# Patient Record
Sex: FEMALE | Race: WHITE | Employment: UNEMPLOYED | URBAN - METROPOLITAN AREA
[De-identification: names, ages, dates, MRNs, and addresses within clinical notes are randomized per-mention and may not be internally consistent; named-entity substitution may affect disease eponyms.]

---

## 2017-01-10 ENCOUNTER — PATIENT MESSAGE (OUTPATIENT)
Dept: FAMILY MEDICINE CLINIC | Age: 72
End: 2017-01-10

## 2017-01-10 DIAGNOSIS — Z01.00 EYE EXAM, ROUTINE: Primary | ICD-10-CM

## 2017-02-24 ENCOUNTER — OFFICE VISIT (OUTPATIENT)
Dept: FAMILY MEDICINE CLINIC | Age: 72
End: 2017-02-24

## 2017-02-24 VITALS
OXYGEN SATURATION: 98 % | DIASTOLIC BLOOD PRESSURE: 76 MMHG | SYSTOLIC BLOOD PRESSURE: 137 MMHG | HEIGHT: 66 IN | BODY MASS INDEX: 28.77 KG/M2 | TEMPERATURE: 98.4 F | WEIGHT: 179 LBS | RESPIRATION RATE: 12 BRPM | HEART RATE: 78 BPM

## 2017-02-24 DIAGNOSIS — Z00.00 WELL ADULT EXAM: ICD-10-CM

## 2017-02-24 DIAGNOSIS — Z91.040 LATEX ALLERGY: ICD-10-CM

## 2017-02-24 DIAGNOSIS — Z13.29 THYROID DISORDER SCREEN: ICD-10-CM

## 2017-02-24 DIAGNOSIS — N30.00 ACUTE CYSTITIS WITHOUT HEMATURIA: ICD-10-CM

## 2017-02-24 DIAGNOSIS — I83.892 VARICOSE VEINS OF LEG WITH EDEMA, LEFT: ICD-10-CM

## 2017-02-24 DIAGNOSIS — Z13.1 DIABETES MELLITUS SCREENING: ICD-10-CM

## 2017-02-24 DIAGNOSIS — Z00.00 WELL ADULT EXAM: Primary | ICD-10-CM

## 2017-02-24 DIAGNOSIS — Z11.59 NEED FOR HEPATITIS C SCREENING TEST: ICD-10-CM

## 2017-02-24 DIAGNOSIS — Z23 NEED FOR PNEUMOCOCCAL VACCINATION: ICD-10-CM

## 2017-02-24 DIAGNOSIS — R35.0 URINARY FREQUENCY: ICD-10-CM

## 2017-02-24 PROBLEM — I83.899 VARICOSE VEINS OF LEG WITH EDEMA: Status: ACTIVE | Noted: 2017-02-24

## 2017-02-24 LAB
A/G RATIO: 1.7 (ref 1.1–2.2)
ALBUMIN SERPL-MCNC: 4.6 G/DL (ref 3.4–5)
ALP BLD-CCNC: 81 U/L (ref 40–129)
ALT SERPL-CCNC: 15 U/L (ref 10–40)
ANION GAP SERPL CALCULATED.3IONS-SCNC: 16 MMOL/L (ref 3–16)
AST SERPL-CCNC: 19 U/L (ref 15–37)
BILIRUB SERPL-MCNC: 0.5 MG/DL (ref 0–1)
BILIRUBIN, POC: NORMAL
BLOOD URINE, POC: NORMAL
BUN BLDV-MCNC: 13 MG/DL (ref 7–20)
CALCIUM SERPL-MCNC: 9.8 MG/DL (ref 8.3–10.6)
CHLORIDE BLD-SCNC: 100 MMOL/L (ref 99–110)
CLARITY, POC: NORMAL
CO2: 28 MMOL/L (ref 21–32)
COLOR, POC: YELLOW
CREAT SERPL-MCNC: 0.9 MG/DL (ref 0.6–1.2)
GFR AFRICAN AMERICAN: >60
GFR NON-AFRICAN AMERICAN: >60
GLOBULIN: 2.7 G/DL
GLUCOSE BLD-MCNC: 104 MG/DL (ref 70–99)
GLUCOSE URINE, POC: NORMAL
KETONES, POC: NORMAL
LEUKOCYTE EST, POC: NORMAL
NITRITE, POC: NORMAL
PH, POC: 6.5
POTASSIUM SERPL-SCNC: 4.2 MMOL/L (ref 3.5–5.1)
PROTEIN, POC: NORMAL
SODIUM BLD-SCNC: 144 MMOL/L (ref 136–145)
SPECIFIC GRAVITY, POC: 1.01
TOTAL PROTEIN: 7.3 G/DL (ref 6.4–8.2)
TSH REFLEX: 2.28 UIU/ML (ref 0.27–4.2)
UROBILINOGEN, POC: 0.2

## 2017-02-24 PROCEDURE — G0439 PPPS, SUBSEQ VISIT: HCPCS | Performed by: FAMILY MEDICINE

## 2017-02-24 PROCEDURE — 81002 URINALYSIS NONAUTO W/O SCOPE: CPT | Performed by: FAMILY MEDICINE

## 2017-02-24 RX ORDER — TRIAMTERENE AND HYDROCHLOROTHIAZIDE 37.5; 25 MG/1; MG/1
1 TABLET ORAL DAILY PRN
Qty: 30 TABLET | Refills: 3 | Status: SHIPPED | OUTPATIENT
Start: 2017-02-24 | End: 2018-08-06

## 2017-02-24 RX ORDER — MAGNESIUM 200 MG
200 TABLET ORAL DAILY
COMMUNITY

## 2017-02-24 RX ORDER — SULFAMETHOXAZOLE AND TRIMETHOPRIM 800; 160 MG/1; MG/1
1 TABLET ORAL 2 TIMES DAILY
Qty: 10 TABLET | Refills: 0 | Status: SHIPPED | OUTPATIENT
Start: 2017-02-24 | End: 2017-03-01

## 2017-02-24 ASSESSMENT — ENCOUNTER SYMPTOMS
ANAL BLEEDING: 0
CONSTIPATION: 0
BLOOD IN STOOL: 0
DIARRHEA: 0
ABDOMINAL PAIN: 0
TROUBLE SWALLOWING: 0
CHOKING: 0
SORE THROAT: 0
NAUSEA: 0
WHEEZING: 0
CHEST TIGHTNESS: 0
SHORTNESS OF BREATH: 0
VOICE CHANGE: 0

## 2017-02-26 LAB
HEPATITIS C ANTIBODY INTERPRETATION: NORMAL
ORGANISM: ABNORMAL
URINE CULTURE, ROUTINE: ABNORMAL

## 2017-02-28 PROCEDURE — G0009 ADMIN PNEUMOCOCCAL VACCINE: HCPCS | Performed by: FAMILY MEDICINE

## 2017-02-28 PROCEDURE — 90732 PPSV23 VACC 2 YRS+ SUBQ/IM: CPT | Performed by: FAMILY MEDICINE

## 2017-03-01 LAB
ALLERGEN LATEX: <0.1 KU/L
ALLERGEN SEE NOTE: NORMAL

## 2017-03-04 ENCOUNTER — PATIENT MESSAGE (OUTPATIENT)
Dept: FAMILY MEDICINE CLINIC | Age: 72
End: 2017-03-04

## 2017-04-17 ENCOUNTER — OFFICE VISIT (OUTPATIENT)
Dept: FAMILY MEDICINE CLINIC | Age: 72
End: 2017-04-17

## 2017-04-17 VITALS
HEIGHT: 66 IN | OXYGEN SATURATION: 97 % | RESPIRATION RATE: 12 BRPM | TEMPERATURE: 97.8 F | BODY MASS INDEX: 28.64 KG/M2 | SYSTOLIC BLOOD PRESSURE: 116 MMHG | DIASTOLIC BLOOD PRESSURE: 65 MMHG | WEIGHT: 178.2 LBS | HEART RATE: 55 BPM

## 2017-04-17 DIAGNOSIS — Z01.818 PREOP EXAMINATION: Primary | ICD-10-CM

## 2017-04-17 DIAGNOSIS — H26.9 CATARACT: ICD-10-CM

## 2017-04-17 DIAGNOSIS — R20.9 SKIN SENSATION DISTURBANCE: ICD-10-CM

## 2017-04-17 DIAGNOSIS — B07.9 VIRAL WARTS, UNSPECIFIED TYPE: ICD-10-CM

## 2017-04-17 PROCEDURE — 17110 DESTRUCTION B9 LES UP TO 14: CPT | Performed by: FAMILY MEDICINE

## 2017-04-17 PROCEDURE — 99214 OFFICE O/P EST MOD 30 MIN: CPT | Performed by: FAMILY MEDICINE

## 2017-04-17 ASSESSMENT — PATIENT HEALTH QUESTIONNAIRE - PHQ9
SUM OF ALL RESPONSES TO PHQ QUESTIONS 1-9: 0
SUM OF ALL RESPONSES TO PHQ9 QUESTIONS 1 & 2: 0
1. LITTLE INTEREST OR PLEASURE IN DOING THINGS: 0
2. FEELING DOWN, DEPRESSED OR HOPELESS: 0

## 2017-05-05 ENCOUNTER — PATIENT MESSAGE (OUTPATIENT)
Dept: FAMILY MEDICINE CLINIC | Age: 72
End: 2017-05-05

## 2017-05-05 DIAGNOSIS — L57.8 SUN-DAMAGED SKIN: Primary | ICD-10-CM

## 2017-05-22 ENCOUNTER — OFFICE VISIT (OUTPATIENT)
Dept: DERMATOLOGY | Age: 72
End: 2017-05-22

## 2017-05-22 DIAGNOSIS — D22.9 BENIGN NEVUS: ICD-10-CM

## 2017-05-22 DIAGNOSIS — L57.0 AK (ACTINIC KERATOSIS): ICD-10-CM

## 2017-05-22 DIAGNOSIS — L82.1 SEBORRHEIC KERATOSES: ICD-10-CM

## 2017-05-22 DIAGNOSIS — D48.5 NEOPLASM OF UNCERTAIN BEHAVIOR OF SKIN: Primary | ICD-10-CM

## 2017-05-22 DIAGNOSIS — L82.0 SEBORRHEIC KERATOSES, INFLAMED: ICD-10-CM

## 2017-05-22 PROCEDURE — 17003 DESTRUCT PREMALG LES 2-14: CPT | Performed by: DERMATOLOGY

## 2017-05-22 PROCEDURE — 17110 DESTRUCTION B9 LES UP TO 14: CPT | Performed by: DERMATOLOGY

## 2017-05-22 PROCEDURE — 17000 DESTRUCT PREMALG LESION: CPT | Performed by: DERMATOLOGY

## 2017-05-22 PROCEDURE — 99213 OFFICE O/P EST LOW 20 MIN: CPT | Performed by: DERMATOLOGY

## 2017-05-25 ENCOUNTER — TELEPHONE (OUTPATIENT)
Dept: DERMATOLOGY | Age: 72
End: 2017-05-25

## 2017-09-18 ENCOUNTER — PATIENT MESSAGE (OUTPATIENT)
Dept: FAMILY MEDICINE CLINIC | Age: 72
End: 2017-09-18

## 2017-09-18 DIAGNOSIS — M16.10 PRIMARY OSTEOARTHRITIS OF HIP, UNSPECIFIED LATERALITY: Primary | ICD-10-CM

## 2017-11-21 ENCOUNTER — OFFICE VISIT (OUTPATIENT)
Dept: FAMILY MEDICINE CLINIC | Age: 72
End: 2017-11-21

## 2017-11-21 ENCOUNTER — TELEPHONE (OUTPATIENT)
Dept: FAMILY MEDICINE CLINIC | Age: 72
End: 2017-11-21

## 2017-11-21 VITALS
DIASTOLIC BLOOD PRESSURE: 78 MMHG | RESPIRATION RATE: 16 BRPM | TEMPERATURE: 99.8 F | BODY MASS INDEX: 30.28 KG/M2 | WEIGHT: 188.4 LBS | HEIGHT: 66 IN | OXYGEN SATURATION: 96 % | HEART RATE: 80 BPM | SYSTOLIC BLOOD PRESSURE: 112 MMHG

## 2017-11-21 DIAGNOSIS — Z01.818 PREOP EXAMINATION: Primary | ICD-10-CM

## 2017-11-21 DIAGNOSIS — M17.12 PRIMARY OSTEOARTHRITIS OF LEFT KNEE: ICD-10-CM

## 2017-11-21 PROCEDURE — 93000 ELECTROCARDIOGRAM COMPLETE: CPT | Performed by: FAMILY MEDICINE

## 2017-11-21 PROCEDURE — 99215 OFFICE O/P EST HI 40 MIN: CPT | Performed by: FAMILY MEDICINE

## 2017-11-21 NOTE — PROGRESS NOTES
Preoperative Consultation      Madelyn Khan  YOB: 1945    Date of Service:  11/21/2017    There were no vitals filed for this visit. Wt Readings from Last 2 Encounters:   04/17/17 178 lb 3.2 oz (80.8 kg)   02/24/17 179 lb (81.2 kg)     BP Readings from Last 3 Encounters:   04/17/17 116/65   02/24/17 137/76   01/11/16 (!) 134/92        Chief Complaint   Patient presents with   [facility] Pre-op Exam     left knee surgery on 12/13 at [facility] w/ Dr. Ro Fletcher. Allergies   Allergen Reactions    Shellfish-Derived Products     Pcn [Penicillins] Rash     No outpatient prescriptions have been marked as taking for the 11/21/17 encounter (Appointment) with Marv Mayes MD.       This patient presents to the office today for a preoperative consultation at the request of surgeon, Dr. Ro Fletcher, who plans on performing left total knee replacement on December 13 at [facility].  .    Planned anesthesia: General   Known anesthesia problems: None   Bleeding risk: No recent or remote history of abnormal bleeding  Personal or FH of DVT/PE: No    Patient objection to receiving blood products: No    Patient Active Problem List   Diagnosis    Osteoarthritis    Lumbar disc disease    Osteoarthritis of hip    Edema    Varicose veins of leg with edema       Past Medical History:   Diagnosis Date    Adenomatous colon polyp 6/2015    Osteoarthritis 4/28/2011    Osteoarthritis, knee     Polyp of colon 4/28/2011    Tinnitus 4/28/2011     Past Surgical History:   Procedure Laterality Date    APPENDECTOMY      BREAST SURGERY      lumpectomy    CATARACT REMOVAL WITH IMPLANT       Family History   Problem Relation Age of Onset    Stroke Mother 80     recovered    Other Mother      DVT    Tuberculosis Father 47    Diabetes Maternal Grandmother      lived to be 80    Osteoarthritis Brother     Osteoarthritis Sister      Social History     Social History    Marital status:      Spouse name: N/A    Number of children: N/A    Years of education: N/A     Occupational History    Not on file. Social History Main Topics    Smoking status: Never Smoker    Smokeless tobacco: Never Used    Alcohol use No    Drug use: No    Sexual activity: Not on file     Other Topics Concern    Not on file     Social History Narrative    No narrative on file       Review of Systems  A comprehensive review of systems was negative except for: mild stable edema. Physical Exam   Constitutional: She is oriented to person, place, and time. She appears well-developed and well-nourished. No distress. HENT:   Head: Normocephalic and atraumatic. Mouth/Throat: Uvula is midline, oropharynx is clear and moist and mucous membranes are normal.   Eyes: Conjunctivae and EOM are normal. Pupils are equal, round, and reactive to light. Neck: Trachea normal and normal range of motion. Neck supple. No JVD present. Carotid bruit is not present. No mass and no thyromegaly present. Cardiovascular: Normal rate, regular rhythm, normal heart sounds and intact distal pulses. Exam reveals no gallop and no friction rub. No murmur heard. Pulmonary/Chest: Effort normal and breath sounds normal. No respiratory distress. She has no wheezes. She has no rales. Abdominal: Soft. Normal aorta and bowel sounds are normal. She exhibits no distension and no mass. There is no hepatosplenomegaly. No tenderness. Musculoskeletal: She exhibits no edema and no tenderness. Neurological: She is alert and oriented to person, place, and time. She has normal strength. No cranial nerve deficit. Coordination and gait normal.   Skin: Skin is warm and dry. No rash noted. No erythema. Psychiatric: She has a normal mood and affect. Her behavior is normal.     EKG Interpretation:  normal EKG, normal sinus rhythm, unchanged from previous tracings.     Lab Review   Lab Results   Component Value Date     02/24/2017    K 4.2 02/24/2017     02/24/2017    CO2 28 02/24/2017    BUN 13 02/24/2017    CREATININE 0.9 02/24/2017    GLUCOSE 104 02/24/2017    CALCIUM 9.8 02/24/2017     Lab Results   Component Value Date    WBC 6.5 11/30/2015    HGB 12.8 11/30/2015    HCT 39.7 11/30/2015    MCV 92.7 11/30/2015     11/30/2015      Has labs ordered at her pre-op at the hospital next week. Assessment:       67 y.o. patient with planned surgery as above. Known risk factors for perioperative complications: None  Current medications which may produce withdrawal symptoms if withheld perioperatively: none      Plan:     1. Preoperative workup as follows: ECG  2. Change in medication regimen before surgery: stop fish oil one week before surgery  3. Prophylaxis for cardiac events with perioperative beta-blockers: Not indicated  ACC/AHA indications for pre-operative beta-blocker use:    · Vascular surgery with history of postitive stress test  · Intermediate or high risk surgery with history of CAD   · Intermediate or high risk surgery with multiple clinical predictors of CAD- 2 of the following: history of compensated or prior heart failure, history of cerebrovascular disease, DM, or renal insufficiency    Routine administration of higher-dose, long-acting metoprolol in beta-blockernaïve patients on the day of surgery, and in the absence of dose titration is associated with an overall increase in mortality. Beta-blockers should be started days to weeks prior to surgery and titrated to pulse < 70.  4. Deep vein thrombosis prophylaxis: regimen to be chosen by surgical team  5. No contraindications to planned surgery pending labs.

## 2018-08-06 ENCOUNTER — OFFICE VISIT (OUTPATIENT)
Dept: FAMILY MEDICINE CLINIC | Age: 73
End: 2018-08-06

## 2018-08-06 VITALS
TEMPERATURE: 97.7 F | RESPIRATION RATE: 12 BRPM | HEART RATE: 64 BPM | OXYGEN SATURATION: 99 % | WEIGHT: 194 LBS | BODY MASS INDEX: 31.55 KG/M2 | DIASTOLIC BLOOD PRESSURE: 76 MMHG | SYSTOLIC BLOOD PRESSURE: 128 MMHG

## 2018-08-06 DIAGNOSIS — Z23 NEED FOR SHINGLES VACCINE: ICD-10-CM

## 2018-08-06 DIAGNOSIS — Z00.00 WELL ADULT EXAM: Primary | ICD-10-CM

## 2018-08-06 DIAGNOSIS — Z23 NEED FOR TDAP VACCINATION: ICD-10-CM

## 2018-08-06 DIAGNOSIS — R35.0 URINARY FREQUENCY: ICD-10-CM

## 2018-08-06 DIAGNOSIS — Z13.29 THYROID DISORDER SCREEN: ICD-10-CM

## 2018-08-06 DIAGNOSIS — I83.892 VARICOSE VEINS OF LEG WITH EDEMA, LEFT: ICD-10-CM

## 2018-08-06 DIAGNOSIS — I83.90 VARICOSE VEIN OF LEG: ICD-10-CM

## 2018-08-06 DIAGNOSIS — N30.00 ACUTE CYSTITIS WITHOUT HEMATURIA: ICD-10-CM

## 2018-08-06 LAB
BILIRUBIN, POC: ABNORMAL
BLOOD URINE, POC: NEGATIVE
CLARITY, POC: ABNORMAL
COLOR, POC: YELLOW
GLUCOSE URINE, POC: NEGATIVE
KETONES, POC: NEGATIVE
LEUKOCYTE EST, POC: ABNORMAL
NITRITE, POC: POSITIVE
PH, POC: 6
PROTEIN, POC: NEGATIVE
SPECIFIC GRAVITY, POC: 1015
UROBILINOGEN, POC: 0.2

## 2018-08-06 PROCEDURE — 81002 URINALYSIS NONAUTO W/O SCOPE: CPT | Performed by: FAMILY MEDICINE

## 2018-08-06 PROCEDURE — G0439 PPPS, SUBSEQ VISIT: HCPCS | Performed by: FAMILY MEDICINE

## 2018-08-06 RX ORDER — TRIAMTERENE AND HYDROCHLOROTHIAZIDE 37.5; 25 MG/1; MG/1
1 TABLET ORAL DAILY PRN
Qty: 30 TABLET | Refills: 5 | Status: SHIPPED | OUTPATIENT
Start: 2018-08-06 | End: 2018-11-05

## 2018-08-06 RX ORDER — NITROFURANTOIN 25; 75 MG/1; MG/1
100 CAPSULE ORAL 2 TIMES DAILY
Qty: 14 CAPSULE | Refills: 0 | Status: SHIPPED | OUTPATIENT
Start: 2018-08-06 | End: 2018-08-13

## 2018-08-06 ASSESSMENT — ENCOUNTER SYMPTOMS
DIARRHEA: 0
BLOOD IN STOOL: 0
SHORTNESS OF BREATH: 0
SORE THROAT: 0
CONSTIPATION: 0
VOICE CHANGE: 0
NAUSEA: 0
TROUBLE SWALLOWING: 0
CHOKING: 0
ANAL BLEEDING: 0
WHEEZING: 0
CHEST TIGHTNESS: 0
ABDOMINAL PAIN: 0

## 2018-08-06 ASSESSMENT — PATIENT HEALTH QUESTIONNAIRE - PHQ9
1. LITTLE INTEREST OR PLEASURE IN DOING THINGS: 0
SUM OF ALL RESPONSES TO PHQ9 QUESTIONS 1 & 2: 0
SUM OF ALL RESPONSES TO PHQ QUESTIONS 1-9: 0
2. FEELING DOWN, DEPRESSED OR HOPELESS: 0

## 2018-08-08 LAB
ORGANISM: ABNORMAL
URINE CULTURE, ROUTINE: ABNORMAL
URINE CULTURE, ROUTINE: ABNORMAL

## 2018-08-08 RX ORDER — SULFAMETHOXAZOLE AND TRIMETHOPRIM 800; 160 MG/1; MG/1
1 TABLET ORAL 2 TIMES DAILY
Qty: 6 TABLET | Refills: 0 | Status: SHIPPED | OUTPATIENT
Start: 2018-08-08 | End: 2018-08-11

## 2018-08-14 ENCOUNTER — TELEPHONE (OUTPATIENT)
Dept: FAMILY MEDICINE CLINIC | Age: 73
End: 2018-08-14

## 2018-08-14 NOTE — TELEPHONE ENCOUNTER
Patient scheduled tomorrow and was advised that referral may not be ready in time due to short notice. Initiate as soon as possible              Specialist name: Sienna Shah   Date of 8-15-18 appointment        Reason for referral: dermatology   Diagnosis:      Insurance Name: Praveen Nicolas ID#: [id number] [facility] #[id number]       Procedure Code:     Specialist NPI#    Specialist Tax ID#          Referrals require 7-10 business days notice for completion. It is the patient's responsibility to ensure the provider is in-network with their insurance company. If an insurance referral is required, authorization must be complete before the appointment or patient may be responsible for the bill.

## 2018-09-05 PROBLEM — Z00.00 WELL ADULT EXAM: Status: RESOLVED | Noted: 2018-08-06 | Resolved: 2018-09-05

## 2018-11-05 ENCOUNTER — OFFICE VISIT (OUTPATIENT)
Dept: DERMATOLOGY | Age: 73
End: 2018-11-05
Payer: MEDICARE

## 2018-11-05 DIAGNOSIS — D22.9 BENIGN NEVUS: ICD-10-CM

## 2018-11-05 DIAGNOSIS — L82.1 SEBORRHEIC KERATOSES: ICD-10-CM

## 2018-11-05 DIAGNOSIS — L82.0 SEBORRHEIC KERATOSES, INFLAMED: Primary | ICD-10-CM

## 2018-11-05 PROCEDURE — 11310 SHAVE SKIN LESION 0.5 CM/<: CPT | Performed by: DERMATOLOGY

## 2018-11-05 PROCEDURE — 99213 OFFICE O/P EST LOW 20 MIN: CPT | Performed by: DERMATOLOGY

## 2018-11-05 PROCEDURE — 17110 DESTRUCTION B9 LES UP TO 14: CPT | Performed by: DERMATOLOGY

## 2018-11-05 NOTE — PROGRESS NOTES
and extremities are multiple well-defined round and oval symmetric smoothly-bordered uniformly brown macules and papules. Multiple hyperkeratotic stuck on papules and plaques over her torso. 2 cm hyperkeratotic stuck on visibly traumatize plaque right temple within hairline  Left nasal side wall 5 mm keratotic papule-irritated seborrheic keratosis          Assessment and Plan     1. Seborrheic keratoses, inflamed - Right temple-1 lesion(s) treated w/ liquid nitrogen. Edu re: risk of blister formation, discomfort, scar, hypopigmentation. Discussed wound care. Left nasal sidewall--Verbal consent obtained after risks (infection, bleeding, scar), benefits and alternatives explained. -Area(s) to be shaved were marked with a surgical pen. Site(s) were cleansed with alcohol. Local anesthesia achieved with 1% lidocaine with epinephrine/sodium bicarbonate. Shave lesion performed with a razor blade. Hemostasis was achieved with aluminum chloride. The wound(s) were dressed with petrolatum and covered with a bandage. Specimen(s) sent to pathology. Pt educated re: risk of bleeding, infection, scar and wound care instructions. 2. Seborrheic keratoses -Monitor for change    3.  Benign nevus - Benign acquired melanocytic nevi  -Recommend monthly self skin exams   -Educated regarding the ABCDEs of melanoma detection   -Call for any new/changing moles or concerning lesions  -Reviewed sun protective behavior -- sun avoidance during the peak hours of the day, sun-protective clothing (including hat and sunglasses), sunscreen use (water resistant, broad spectrum, SPF at least 30, need for reapplication every 2 to 3 hours), avoidance of tanning beds

## 2018-11-07 ENCOUNTER — PROCEDURE VISIT (OUTPATIENT)
Dept: VASCULAR SURGERY | Age: 73
End: 2018-11-07
Payer: MEDICARE

## 2018-11-07 ENCOUNTER — OFFICE VISIT (OUTPATIENT)
Dept: VASCULAR SURGERY | Age: 73
End: 2018-11-07
Payer: MEDICARE

## 2018-11-07 VITALS
SYSTOLIC BLOOD PRESSURE: 132 MMHG | HEIGHT: 68 IN | BODY MASS INDEX: 28.04 KG/M2 | DIASTOLIC BLOOD PRESSURE: 78 MMHG | WEIGHT: 185 LBS | HEART RATE: 72 BPM

## 2018-11-07 DIAGNOSIS — I83.12 VARICOSE VEINS OF BOTH LOWER EXTREMITIES WITH INFLAMMATION: Primary | ICD-10-CM

## 2018-11-07 DIAGNOSIS — I82.819: ICD-10-CM

## 2018-11-07 DIAGNOSIS — I80.02 PHLEBITIS OF SUPERFICIAL VEIN OF LEFT LOWER EXTREMITY: ICD-10-CM

## 2018-11-07 DIAGNOSIS — I83.11 VARICOSE VEINS OF BOTH LOWER EXTREMITIES WITH INFLAMMATION: Primary | ICD-10-CM

## 2018-11-07 DIAGNOSIS — I83.892 SYMPTOMATIC VARICOSE VEINS OF LEFT LOWER EXTREMITY: Primary | ICD-10-CM

## 2018-11-07 LAB — DERMATOLOGY PATHOLOGY REPORT: NORMAL

## 2018-11-07 PROCEDURE — 99204 OFFICE O/P NEW MOD 45 MIN: CPT | Performed by: SURGERY

## 2018-11-07 PROCEDURE — 93970 EXTREMITY STUDY: CPT | Performed by: SURGERY

## 2018-11-07 ASSESSMENT — ENCOUNTER SYMPTOMS
EYES NEGATIVE: 1
GASTROINTESTINAL NEGATIVE: 1
ALLERGIC/IMMUNOLOGIC NEGATIVE: 1
RESPIRATORY NEGATIVE: 1

## 2019-01-28 ENCOUNTER — OFFICE VISIT (OUTPATIENT)
Dept: FAMILY MEDICINE CLINIC | Age: 74
End: 2019-01-28
Payer: MEDICARE

## 2019-01-28 VITALS
HEART RATE: 79 BPM | OXYGEN SATURATION: 99 % | RESPIRATION RATE: 12 BRPM | DIASTOLIC BLOOD PRESSURE: 86 MMHG | BODY MASS INDEX: 30.37 KG/M2 | TEMPERATURE: 97.8 F | WEIGHT: 196.8 LBS | SYSTOLIC BLOOD PRESSURE: 142 MMHG

## 2019-01-28 DIAGNOSIS — G56.21 ULNAR NEUROPATHY AT ELBOW, RIGHT: ICD-10-CM

## 2019-01-28 DIAGNOSIS — R10.2 PELVIC PAIN: Primary | ICD-10-CM

## 2019-01-28 DIAGNOSIS — R42 VERTIGO: ICD-10-CM

## 2019-01-28 LAB
BILIRUBIN, POC: NEGATIVE
BLOOD URINE, POC: NEGATIVE
CLARITY, POC: CLEAR
COLOR, POC: YELLOW
GLUCOSE URINE, POC: NEGATIVE
KETONES, POC: NEGATIVE
LEUKOCYTE EST, POC: NORMAL
NITRITE, POC: NEGATIVE
PH, POC: 7.5
PROTEIN, POC: NORMAL
SPECIFIC GRAVITY, POC: 1
UROBILINOGEN, POC: 0.2

## 2019-01-28 PROCEDURE — 81002 URINALYSIS NONAUTO W/O SCOPE: CPT | Performed by: FAMILY MEDICINE

## 2019-01-28 PROCEDURE — 99214 OFFICE O/P EST MOD 30 MIN: CPT | Performed by: FAMILY MEDICINE

## 2019-01-30 DIAGNOSIS — R10.2 PELVIC PAIN: Primary | ICD-10-CM

## 2019-01-30 LAB — URINE CULTURE, ROUTINE: NORMAL

## 2019-08-27 ENCOUNTER — OFFICE VISIT (OUTPATIENT)
Dept: FAMILY MEDICINE CLINIC | Age: 74
End: 2019-08-27
Payer: MEDICARE

## 2019-08-27 VITALS
DIASTOLIC BLOOD PRESSURE: 86 MMHG | RESPIRATION RATE: 12 BRPM | TEMPERATURE: 98.3 F | BODY MASS INDEX: 27.16 KG/M2 | HEIGHT: 66 IN | WEIGHT: 169 LBS | SYSTOLIC BLOOD PRESSURE: 136 MMHG | OXYGEN SATURATION: 100 % | HEART RATE: 80 BPM

## 2019-08-27 DIAGNOSIS — Z12.31 ENCOUNTER FOR SCREENING MAMMOGRAM FOR BREAST CANCER: Primary | ICD-10-CM

## 2019-08-27 DIAGNOSIS — Z00.00 WELL ADULT EXAM: ICD-10-CM

## 2019-08-27 DIAGNOSIS — Z13.29 THYROID DISORDER SCREEN: ICD-10-CM

## 2019-08-27 DIAGNOSIS — Z00.00 ROUTINE GENERAL MEDICAL EXAMINATION AT A HEALTH CARE FACILITY: ICD-10-CM

## 2019-08-27 DIAGNOSIS — Z13.220 LIPID SCREENING: ICD-10-CM

## 2019-08-27 PROCEDURE — G0439 PPPS, SUBSEQ VISIT: HCPCS | Performed by: FAMILY MEDICINE

## 2019-08-27 ASSESSMENT — LIFESTYLE VARIABLES
HOW OFTEN DURING THE LAST YEAR HAVE YOU HAD A FEELING OF GUILT OR REMORSE AFTER DRINKING: 0
AUDIT TOTAL SCORE: 1
HOW OFTEN DO YOU HAVE A DRINK CONTAINING ALCOHOL: 1
HOW OFTEN DURING THE LAST YEAR HAVE YOU FAILED TO DO WHAT WAS NORMALLY EXPECTED FROM YOU BECAUSE OF DRINKING: 0
AUDIT-C TOTAL SCORE: 1
HAVE YOU OR SOMEONE ELSE BEEN INJURED AS A RESULT OF YOUR DRINKING: 0
HOW OFTEN DURING THE LAST YEAR HAVE YOU FOUND THAT YOU WERE NOT ABLE TO STOP DRINKING ONCE YOU HAD STARTED: 0
HOW MANY STANDARD DRINKS CONTAINING ALCOHOL DO YOU HAVE ON A TYPICAL DAY: 0
HAS A RELATIVE, FRIEND, DOCTOR, OR ANOTHER HEALTH PROFESSIONAL EXPRESSED CONCERN ABOUT YOUR DRINKING OR SUGGESTED YOU CUT DOWN: 0
HOW OFTEN DURING THE LAST YEAR HAVE YOU NEEDED AN ALCOHOLIC DRINK FIRST THING IN THE MORNING TO GET YOURSELF GOING AFTER A NIGHT OF HEAVY DRINKING: 0
HOW OFTEN DO YOU HAVE SIX OR MORE DRINKS ON ONE OCCASION: 0
HOW OFTEN DURING THE LAST YEAR HAVE YOU BEEN UNABLE TO REMEMBER WHAT HAPPENED THE NIGHT BEFORE BECAUSE YOU HAD BEEN DRINKING: 0

## 2019-08-27 ASSESSMENT — PATIENT HEALTH QUESTIONNAIRE - PHQ9
SUM OF ALL RESPONSES TO PHQ QUESTIONS 1-9: 0
SUM OF ALL RESPONSES TO PHQ QUESTIONS 1-9: 0

## 2019-08-27 NOTE — PROGRESS NOTES
recovered    Other Mother         DVT    Tuberculosis Father 47    Diabetes Maternal Grandmother         lived to be 80    Osteoarthritis Brother     Colon Cancer Brother 67    Osteoarthritis Sister    Lauraine Perks Sister 79    Cancer Sister 48        [facility]       [place]Team (Including outside providers/suppliers regularly involved in providing care):   Patient Care Team:  Rhiannon Knapp MD as PCP - General (Family Medicine)  Rhiannon Knapp [facility] PCP [facility]    Wt Readings from Last 3 Encounters:   08/27/19 169 lb (76.7 kg)   01/28/19 196 lb 12.8 oz (89.3 kg)   11/07/18 185 lb (83.9 kg)     Vitals:    08/27/19 0838   BP: 136/86   Pulse: 80   Resp: 12   Temp: 98.3 °F (36.8 °C)   TempSrc: Oral   SpO2: 100%   Weight: 169 lb (76.7 kg)   Height: 5' 5.5\" (1.664 m)     Body mass index is 27.7 kg/m². Based upon direct observation of the patient, evaluation of cognition reveals recent and remote memory intact.     General Appearance: alert and oriented to person, place and time, well developed and well- nourished, in no acute distress  Skin: warm and dry, no rash or erythema  Head: normocephalic and atraumatic  Eyes: pupils equal, round, and reactive to light, extraocular eye movements intact, conjunctivae normal  ENT: tympanic membrane, external ear and ear canal normal bilaterally, nose without deformity, nasal mucosa and turbinates normal without polyps  Neck: supple and non-tender without mass, no thyromegaly or thyroid nodules, no cervical lymphadenopathy  Pulmonary/Chest: clear to auscultation bilaterally- no wheezes, rales or rhonchi, normal air movement, no respiratory distress  Cardiovascular: normal rate, regular rhythm, normal S1 and S2, no murmurs, rubs, clicks, or gallops, distal pulses intact, no carotid bruits  Abdomen: soft, non-tender, non-distended, normal bowel sounds, no masses or organomegaly  Extremities: no cyanosis, clubbing or edema  Musculoskeletal: normal range of motion, no joint swelling, deformity or tenderness  Neurologic: reflexes normal and symmetric, no cranial nerve deficit, gait, coordination and speech normal    Patient's complete Health Risk Assessment and screening values have been reviewed and are found in Flowsheets. The following problems were reviewed today and where indicated follow up appointments were made and/or referrals ordered. Positive Risk Factor Screenings with Interventions:     No Positive Risk Factors identified today. Personalized Preventive Plan   Current Health Maintenance Status  Immunization History   Administered Date(s) Administered    Pneumococcal Conjugate 13-valent ([id number]) 11/30/2015    Pneumococcal Polysaccharide ([id number]) 02/28/2017    Td, unspecified formulation 01/01/2005    Zoster Live (Zostavax) 06/29/2010, 05/22/2013        Health Maintenance   Topic Date Due    DTaP/Tdap/Td vaccine (1 - Tdap) 01/02/2005    Shingles Vaccine (2 of 3) 07/17/2013    Lipid screen  10/14/2018    Breast cancer screen  01/23/2019    Annual Wellness Visit (AWV)  08/06/2019    Flu vaccine (1) 09/01/2019    Colon cancer screen colonoscopy  05/07/2020    DEXA (modify frequency per FRAX score)  04/07/2021    Pneumococcal 65+ years Vaccine  Completed    Hepatitis C screen  Completed     Recommendations for Preventive Services Due: see orders and patient instructions/AVS.  . Recommended screening schedule for the next 5-10 years is provided to the patient in written form: see Patient Instructions/AVS.    There are no diagnoses linked to this encounter. Advance Care Planning   Advanced Care Planning: Discussed the patients choices for care and treatment in case of a health event that adversely affects decision-making abilities. Also discussed the patients long-term treatment options.  Reviewed with the patient the [address] [place] [address] Will Declaration forms  Reviewed the process of designating a

## 2019-08-28 LAB
ALBUMIN SERPL-MCNC: 3.9 G/DL
ALP BLD-CCNC: 60 U/L
ALT SERPL-CCNC: 14 U/L
ANION GAP SERPL CALCULATED.3IONS-SCNC: 9 MMOL/L
AST SERPL-CCNC: 20 U/L
BILIRUB SERPL-MCNC: 0.7 MG/DL (ref 0.1–1.4)
BUN BLDV-MCNC: 22 MG/DL
CALCIUM SERPL-MCNC: 9.7 MG/DL
CHLORIDE BLD-SCNC: 104 MMOL/L
CHOLESTEROL, TOTAL: 235 MG/DL
CHOLESTEROL/HDL RATIO: NORMAL
CO2: 29 MMOL/L
CREAT SERPL-MCNC: 0.94 MG/DL
GFR CALCULATED: NORMAL
GLUCOSE BLD-MCNC: 93 MG/DL
HDLC SERPL-MCNC: 64 MG/DL (ref 35–70)
LDL CHOLESTEROL CALCULATED: 141 MG/DL (ref 0–160)
POTASSIUM SERPL-SCNC: 4.2 MMOL/L
SODIUM BLD-SCNC: 142 MMOL/L
TOTAL PROTEIN: 7.2
TRIGL SERPL-MCNC: 150 MG/DL
TSH SERPL DL<=0.05 MIU/L-ACNC: 2.01 UIU/ML
VLDLC SERPL CALC-MCNC: NORMAL MG/DL

## 2019-08-29 DIAGNOSIS — Z13.220 LIPID SCREENING: ICD-10-CM

## 2019-08-29 DIAGNOSIS — Z00.00 WELL ADULT EXAM: ICD-10-CM

## 2019-08-29 DIAGNOSIS — Z13.29 THYROID DISORDER SCREEN: ICD-10-CM

## 2020-01-24 ENCOUNTER — TELEPHONE (OUTPATIENT)
Dept: ADMINISTRATIVE | Age: 75
End: 2020-01-24

## 2020-01-24 NOTE — TELEPHONE ENCOUNTER
PT. WANTS PCP TO SEND A REFERRAL FOR A dermatology appt to [facility] derm. marcela and thank you. Melvina Kinney

## 2020-01-30 NOTE — PROGRESS NOTES
Osteoarthritis Sister     Lung Cancer Sister 79    Cancer Sister 48        NMSC       Social History     Tobacco Use    Smoking status: Never Smoker    Smokeless tobacco: Never Used   Substance Use Topics    Alcohol use: No    Drug use: No            Review of Systems  Review of Systems    Objective:   Physical Exam  Vitals:    01/31/20 0926   BP: 126/78   Pulse: 78   Resp: 10   Temp: 98.1 °F (36.7 °C)   TempSrc: Oral   SpO2: 97%   Weight: 171 lb (77.6 kg)   Height: 5' 7\" (1.702 m)       Physical Exam  Constitutional:       General: She is not in acute distress. Appearance: She is well-developed. Cardiovascular:      Rate and Rhythm: Normal rate and regular rhythm. Heart sounds: Normal heart sounds. Pulmonary:      Effort: Pulmonary effort is normal.      Breath sounds: Normal breath sounds. Skin:     General: Skin is warm and dry. Comments: Top of scalp on the right is a 5 mm scaly, raised, oily light brown lesion. Anterior portion scabbed. No ulceration, induration. > 10 2-6 mm raised oily light to dark brown homogenous nevi           Assessment:       Diagnosis Orders   1. Seborrheic keratoses, inflamed  Triple antibx ointment BID x 7 days. 2. Skin lesions            Plan:      Reassured. No s/s malignancy. Scalp lesion may need bx if does not heal but appears benign. Keep appt with Dr. Demarco Zhou 2/10/20. Call or return to clinic prn if these symptoms worsen or fail to improve as anticipated.

## 2020-01-31 ENCOUNTER — OFFICE VISIT (OUTPATIENT)
Dept: FAMILY MEDICINE CLINIC | Age: 75
End: 2020-01-31
Payer: MEDICARE

## 2020-01-31 VITALS
OXYGEN SATURATION: 97 % | TEMPERATURE: 98.1 F | RESPIRATION RATE: 10 BRPM | SYSTOLIC BLOOD PRESSURE: 126 MMHG | WEIGHT: 171 LBS | HEIGHT: 67 IN | DIASTOLIC BLOOD PRESSURE: 78 MMHG | BODY MASS INDEX: 26.84 KG/M2 | HEART RATE: 78 BPM

## 2020-01-31 PROCEDURE — 99213 OFFICE O/P EST LOW 20 MIN: CPT | Performed by: FAMILY MEDICINE

## 2020-01-31 ASSESSMENT — PATIENT HEALTH QUESTIONNAIRE - PHQ9
1. LITTLE INTEREST OR PLEASURE IN DOING THINGS: 0
SUM OF ALL RESPONSES TO PHQ QUESTIONS 1-9: 0
2. FEELING DOWN, DEPRESSED OR HOPELESS: 0
SUM OF ALL RESPONSES TO PHQ9 QUESTIONS 1 & 2: 0
SUM OF ALL RESPONSES TO PHQ QUESTIONS 1-9: 0

## 2020-02-10 ENCOUNTER — OFFICE VISIT (OUTPATIENT)
Dept: DERMATOLOGY | Age: 75
End: 2020-02-10
Payer: MEDICARE

## 2020-02-10 PROCEDURE — 99213 OFFICE O/P EST LOW 20 MIN: CPT | Performed by: DERMATOLOGY

## 2020-02-10 PROCEDURE — 11102 TANGNTL BX SKIN SINGLE LES: CPT | Performed by: DERMATOLOGY

## 2020-02-10 NOTE — PROGRESS NOTES
[facility]) Dermatology  Feliciano Franks M.D.  [phone]       Lindsay Pickens      76 y.o. female     Date of Visit: 2/10/2020    Chief Complaint:   Chief Complaint   Patient presents with    Skin Lesion     spots on scalp- possible bleeding        I was asked to see this patient by Dr. Chapa ref. provider found. History of Present Illness:  1. Patient presents today for evaluation of a crusted papule on her right anterior scalp. She has a history of multiple seborrheic keratoses on her scalp-they are usually asymptomatic. A lesion on her right anterior scalp was noted by her hairdresser to have become crusted and bleed. Patient presents today for evaluation. Skin Hx:  No PH skin cancer/ DN   left anterior thigh hypertrophic actinic keratosis treated with curettage  + sister with over Julio Noemy 15[address] N[facility] (immunosuppressed)  Review of Systems:  Constitutional: Reports general sense of well-being       Past Medical History, Surgical History, Family History, Medications and Allergies reviewed.     Social History:   Social History     Socioeconomic History    Marital status:      Spouse name: Not on file    Number of children: Not on file    Years of education: Not on file    Highest education level: Not on file   Occupational History    Not on file   Social Needs    Financial resource strain: Not on file    Food insecurity:     Worry: Not on file     Inability: Not on file    Transportation needs:     Medical: Not on file     Non-medical: Not on file   Tobacco Use    Smoking status: Never Smoker    Smokeless tobacco: Never Used   Substance and Sexual Activity    Alcohol use: No    Drug use: No    Sexual activity: Yes     Partners: Male   Lifestyle    Physical activity:     Days per week: Not on file     Minutes per session: Not on file    Stress: Not on file   Relationships    Social connections:     Talks on phone: Not on file     Gets together: Not on file     Attends Qu[employer] service: Not on file     Active member of club or organization: Not on file     Attends meetings of clubs or organizations: Not on file     Relationship status: Not on file    Intimate partner violence:     Fear of current or ex partner: Not on file     Emotionally abused: Not on file     Physically abused: Not on file     Forced sexual activity: Not on file   Other Topics Concern    Not on file   Social History Narrative    Not on file       Physical Examination       -General: Well-appearing, NAD  Multiple hyperkeratotic stuck on papules and plaques throughout her scalp  Right anterior scalp 1.0 cm brown scaly plaque with superficial eschar-likely traumatized seborrheic keratosis rule out squamous cell carcinoma          Assessment and Plan     1. Seborrheic keratoses, inflamed-right anterior scalp--Discussed possible diagnosis. Patient agreeable to biopsy. Verbal consent obtained after risks (infection, bleeding, scar), benefits and alternatives explained. -Area(s) to be biopsied were marked with a surgical pen. Site(s) were cleansed with alcohol. Local anesthesia achieved with 1% lidocaine with epinephrine/sodium bicarbonate. Shave biopsy performed with a razor blade. Hemostasis was achieved with aluminum chloride. The wound(s) were dressed with petrolatum and covered with a bandage. Specimen(s) sent to pathology. Pt educated re: risk of bleeding, infection, scar and wound care instructions. 2. Seborrheic keratoses-monitor for change.   Treatment if lesions become symptomatic

## 2020-02-12 LAB — DERMATOLOGY PATHOLOGY REPORT: NORMAL

## 2020-02-24 ENCOUNTER — PATIENT MESSAGE (OUTPATIENT)
Dept: FAMILY MEDICINE CLINIC | Age: 75
End: 2020-02-24

## 2020-02-24 NOTE — TELEPHONE ENCOUNTER
From: Laura Rosas  To: Giovanni Meade MD  Sent: 2/24/2020  4:37 PM EST  Subject: Non-Urgent Medical Question    I have a scheduled eye appt. on March 6th which my insurance provider requires me to have a referral. If you would please provide this to Dr. Boyce at [facility] I would appreciate it.  Thank you, Alyssia Reyes [birth year]

## 2020-05-21 ENCOUNTER — NURSE TRIAGE (OUTPATIENT)
Dept: OTHER | Facility: CLINIC | Age: 75
End: 2020-05-21

## 2020-06-18 ENCOUNTER — TELEPHONE (OUTPATIENT)
Dept: FAMILY MEDICINE CLINIC | Age: 75
End: 2020-06-18

## 2020-06-29 PROBLEM — R91.8 PULMONARY NODULES: Status: ACTIVE | Noted: 2020-06-29

## 2020-06-29 PROBLEM — Z86.0101 HX OF ADENOMATOUS POLYP OF COLON: Status: ACTIVE | Noted: 2020-06-29

## 2020-06-29 PROBLEM — Z86.010 HX OF ADENOMATOUS POLYP OF COLON: Status: ACTIVE | Noted: 2020-06-29

## 2020-06-29 NOTE — PROGRESS NOTES
protocol.     Additional maximum intensity projection images were performed.       CONTRAST: 125 mL of IOHEXOL 350 MG IODINE/ML INTRAVENOUS SOLUTION administered intravenously        FINDINGS:    Study quality: Adequate    Artifacts: None       Medical devices: None.       Airways & lungs: The central airways are patent. Mild lingular and left basilar parenchymal    banding related atelectasis/scarring. There is minimal dependent atelectasis bilaterally. Otherwise, lungs are clear. There is a pulmonary nodule in the right upper lobe measuring 8 mm    in diameter (series 304 image 94). There are an additional limited number of pulmonary nodules    measuring up to 2 mm including a 2 mm nodule in the lingula (series 304, image 95).     Pleura: No pleural effusions or pneumothorax.       Lower Neck: Unremarkable.       Pulmonary Embolism: None.       Heart: The heart is normal in size. Minimal coronary artery calcifications.       Additional vascular structures: Unremarkable.       Mediastinum and yudith: Mildly prominent partially calcified bilateral hilar nodes are likely the    sequela of prior healed granulomatous disease. There are no enlarged mediastinal or hilar    lymph nodes.       Chest wall and axilla: Sebaceous cyst noted in the posterior back.       Upper abdomen: 1.3 cm low-density lesion in the right hepatic lobe, compatible with a benign    cyst or hemangioma. Otherwise, imaged upper abdomen is unremarkable.       Musculoskeletal: No suspicious osteolytic or osteoblastic lesions.     125   IMPRESSION:       1.  No acute pulmonary embolism. 2.  No findings to explain patient's left flank pain. 3.  Pulmonary nodules measuring up to 8 mm in average diameter.  Current guidelines recommend    follow-up low-dose chest CT in 6-12 months to ensure stability or resolution       Approved by Veto Aguilera DO on 6/8/2020 1:59 PM EDT         No outpatient medications have been marked as taking for the 7/1/20 encounter (Appointment) with Sabra Felix MD.        Allergies   Allergen Reactions    Shellfish-Derived Products     Pcn [Penicillins] Rash       Patient Active Problem List   Diagnosis    Osteoarthritis    Lumbar disc disease    Osteoarthritis of hip    Edema    Varicose veins of leg with edema    Pulmonary nodules       Past Medical History:   Diagnosis Date    Adenomatous colon polyp 6/2015    Osteoarthritis 4/28/2011    Osteoarthritis, knee     Polyp of colon 4/28/2011    Tinnitus 4/28/2011       Past Surgical History:   Procedure Laterality Date    APPENDECTOMY      BREAST SURGERY      lumpectomy    CATARACT REMOVAL WITH IMPLANT      JOINT REPLACEMENT Left 12/2017    JOINT REPLACEMENT Bilateral 2013, 2014        Family History   Problem Relation Age of Onset    Stroke Mother 80        recovered    Other Mother         DVT    Tuberculosis Father 47    Diabetes Maternal Grandmother         lived to be 80    Osteoarthritis Brother     Colon Cancer Brother 67    Osteoarthritis Sister     Lung Cancer Sister 79    Cancer Sister 48        [facility]       Social History     Tobacco Use    Smoking status: Never Smoker    Smokeless tobacco: Never Used   Substance Use Topics    Alcohol use: No    Drug use: No            Review of Systems  Review of Systems    Objective:   Physical Exam  Vitals:    07/01/20 0928   BP: 124/66   Pulse: 77   Resp: 12   Temp: 97.4 °F (36.3 °C)   TempSrc: Temporal   SpO2: 99%   Weight: 175 lb 9.6 oz (79.7 kg)       Physical Exam  NAD  Skin is warm and dry. Well hydrated, no rash. Ear exam - bilateral normal, TM intact without perforation or effusion, external canal normal. No significant ceruminosis noted. Nasal exam; septum midline, no deformities, nares patent, normal mucosa without swelling, no polyps, no bleeding. Pharynx normal, no oral lesions, dentition in good repair. Chest is clear, no wheezing or rales.  Normal symmetric air entry throughout both

## 2020-07-01 ENCOUNTER — OFFICE VISIT (OUTPATIENT)
Dept: FAMILY MEDICINE CLINIC | Age: 75
End: 2020-07-01
Payer: MEDICARE

## 2020-07-01 VITALS
WEIGHT: 175.6 LBS | HEART RATE: 77 BPM | SYSTOLIC BLOOD PRESSURE: 124 MMHG | RESPIRATION RATE: 12 BRPM | TEMPERATURE: 97.4 F | OXYGEN SATURATION: 99 % | DIASTOLIC BLOOD PRESSURE: 66 MMHG | BODY MASS INDEX: 27.5 KG/M2

## 2020-07-01 LAB
BILIRUBIN, POC: ABNORMAL
BLOOD URINE, POC: NEGATIVE
CLARITY, POC: ABNORMAL
COLOR, POC: YELLOW
GLUCOSE URINE, POC: NEGATIVE
KETONES, POC: NEGATIVE
LEUKOCYTE EST, POC: ABNORMAL
NITRITE, POC: NEGATIVE
PH, POC: 6
PROTEIN, POC: NEGATIVE
SPECIFIC GRAVITY, POC: 1.01
UROBILINOGEN, POC: 0.2

## 2020-07-01 PROCEDURE — 81002 URINALYSIS NONAUTO W/O SCOPE: CPT | Performed by: FAMILY MEDICINE

## 2020-07-01 PROCEDURE — 99214 OFFICE O/P EST MOD 30 MIN: CPT | Performed by: FAMILY MEDICINE

## 2020-09-23 ENCOUNTER — NURSE TRIAGE (OUTPATIENT)
Dept: OTHER | Facility: CLINIC | Age: 75
End: 2020-09-23

## 2020-09-23 ENCOUNTER — OFFICE VISIT (OUTPATIENT)
Dept: FAMILY MEDICINE CLINIC | Age: 75
End: 2020-09-23
Payer: MEDICARE

## 2020-09-23 VITALS
BODY MASS INDEX: 27.62 KG/M2 | TEMPERATURE: 97.5 F | HEIGHT: 67 IN | SYSTOLIC BLOOD PRESSURE: 118 MMHG | WEIGHT: 176 LBS | DIASTOLIC BLOOD PRESSURE: 83 MMHG | OXYGEN SATURATION: 98 % | HEART RATE: 83 BPM | RESPIRATION RATE: 19 BRPM

## 2020-09-23 LAB
BACTERIA: ABNORMAL /HPF
BILIRUBIN URINE: NEGATIVE
BILIRUBIN, POC: NEGATIVE
BLOOD URINE, POC: ABNORMAL
BLOOD, URINE: NEGATIVE
CLARITY, POC: CLEAR
CLARITY: CLEAR
COLOR, POC: YELLOW
COLOR: YELLOW
EPITHELIAL CELLS, UA: 1 /HPF (ref 0–5)
GLUCOSE URINE, POC: NEGATIVE
GLUCOSE URINE: NEGATIVE MG/DL
HYALINE CASTS: 1 /LPF (ref 0–8)
KETONES, POC: NEGATIVE
KETONES, URINE: NEGATIVE MG/DL
LEUKOCYTE EST, POC: ABNORMAL
LEUKOCYTE ESTERASE, URINE: ABNORMAL
MICROSCOPIC EXAMINATION: YES
NITRITE, POC: NEGATIVE
NITRITE, URINE: NEGATIVE
PH UA: 6 (ref 5–8)
PH, POC: 5
PROTEIN UA: NEGATIVE MG/DL
PROTEIN, POC: NEGATIVE
RBC UA: 0 /HPF (ref 0–4)
SPECIFIC GRAVITY UA: 1.01 (ref 1–1.03)
SPECIFIC GRAVITY, POC: 1
URINE REFLEX TO CULTURE: ABNORMAL
URINE TYPE: ABNORMAL
UROBILINOGEN, POC: NEGATIVE
UROBILINOGEN, URINE: 0.2 E.U./DL
WBC UA: 2 /HPF (ref 0–5)

## 2020-09-23 PROCEDURE — 99213 OFFICE O/P EST LOW 20 MIN: CPT | Performed by: FAMILY MEDICINE

## 2020-09-23 PROCEDURE — 81002 URINALYSIS NONAUTO W/O SCOPE: CPT | Performed by: FAMILY MEDICINE

## 2020-09-23 RX ORDER — NITROFURANTOIN 25; 75 MG/1; MG/1
100 CAPSULE ORAL 2 TIMES DAILY
Qty: 14 CAPSULE | Refills: 0 | Status: SHIPPED | OUTPATIENT
Start: 2020-09-23 | End: 2020-09-30

## 2020-09-23 NOTE — PROGRESS NOTES
 Tuberculosis Father 47    Diabetes Maternal Grandmother         lived to be 80    Osteoarthritis Brother     Colon Cancer Brother 67    Osteoarthritis Sister     Lung Cancer Sister 79    Cancer Sister 48        [facility]       Social History     Tobacco Use    Smoking status: Never Smoker    Smokeless tobacco: Never Used   Substance Use Topics    Alcohol use: No    Drug use: No            Review of Systems  Review of Systems    Objective:   Physical Exam  Vitals:    09/23/20 1603   BP: 118/83   Site: Right Upper Arm   Position: Sitting   Cuff Size: Medium Adult   Pulse: 83   Resp: 19   Temp: 97.5 °F (36.4 °C)   TempSrc: Temporal   SpO2: 98%   Weight: 176 lb (79.8 kg)   Height: 5' 7\" (1.702 m)       Physical Exam    NAD  Skin warm and dry. Chest is clear, no wheezing or rales. Normal symmetric air entry throughout both lung fields. Heart regular with normal rate, no murmer or gallop The abdomen is soft with mild suprabubic tenderness; no  guarding, mass, rebound or organomegaly. Bowel sounds are normal. No CVA tenderness or inguinal adenopathy noted. Assessment:       Diagnosis Orders   1. Acute cystitis without hematuria  POCT Urinalysis no Micro    nitrofurantoin, macrocrystal-monohydrate, (MACROBID) 100 MG capsule    URINE RT REFLEX TO CULTURE  Prevention of UTIs discussed: drink plenty of fluids, avoid armando, avoid holding urine, double void, empty bladder before or after intercourse, eat yogurt on a daily basis. 2. Need for influenza vaccination  Declines. Risk and benefit addressed and vaccine recommended          Plan:      Side effects of current medications reviewed and questions answered. Call or return to clinic prn if these symptoms worsen or fail to improve as anticipated.

## 2020-11-27 ENCOUNTER — OFFICE VISIT (OUTPATIENT)
Dept: FAMILY MEDICINE CLINIC | Age: 75
End: 2020-11-27
Payer: MEDICARE

## 2020-11-27 VITALS
RESPIRATION RATE: 16 BRPM | HEART RATE: 74 BPM | TEMPERATURE: 97.2 F | WEIGHT: 182.4 LBS | BODY MASS INDEX: 28.63 KG/M2 | HEIGHT: 67 IN | SYSTOLIC BLOOD PRESSURE: 128 MMHG | DIASTOLIC BLOOD PRESSURE: 72 MMHG | OXYGEN SATURATION: 99 %

## 2020-11-27 LAB
BILIRUBIN, POC: ABNORMAL
BLOOD URINE, POC: NEGATIVE
CLARITY, POC: ABNORMAL
COLOR, POC: ABNORMAL
GLUCOSE URINE, POC: NEGATIVE
KETONES, POC: NEGATIVE
LEUKOCYTE EST, POC: ABNORMAL
NITRITE, POC: POSITIVE
PH, POC: 6
PROTEIN, POC: ABNORMAL
SPECIFIC GRAVITY, POC: 1.02
UROBILINOGEN, POC: 0.2

## 2020-11-27 PROCEDURE — G0439 PPPS, SUBSEQ VISIT: HCPCS | Performed by: FAMILY MEDICINE

## 2020-11-27 PROCEDURE — 81002 URINALYSIS NONAUTO W/O SCOPE: CPT | Performed by: FAMILY MEDICINE

## 2020-11-27 RX ORDER — ESTRADIOL 0.1 MG/G
CREAM VAGINAL
Qty: 1 TUBE | Refills: 3 | Status: SHIPPED | OUTPATIENT
Start: 2020-11-27

## 2020-11-27 RX ORDER — NITROFURANTOIN 25; 75 MG/1; MG/1
100 CAPSULE ORAL 2 TIMES DAILY
Qty: 14 CAPSULE | Refills: 0 | Status: SHIPPED | OUTPATIENT
Start: 2020-11-27 | End: 2020-12-04

## 2020-11-27 ASSESSMENT — PATIENT HEALTH QUESTIONNAIRE - PHQ9
2. FEELING DOWN, DEPRESSED OR HOPELESS: 0
SUM OF ALL RESPONSES TO PHQ QUESTIONS 1-9: 0
SUM OF ALL RESPONSES TO PHQ9 QUESTIONS 1 & 2: 0
1. LITTLE INTEREST OR PLEASURE IN DOING THINGS: 0
SUM OF ALL RESPONSES TO PHQ QUESTIONS 1-9: 0
SUM OF ALL RESPONSES TO PHQ QUESTIONS 1-9: 0

## 2020-11-27 ASSESSMENT — LIFESTYLE VARIABLES
HOW OFTEN DO YOU HAVE A DRINK CONTAINING ALCOHOL: NEVER
AUDIT TOTAL SCORE: INCOMPLETE
AUDIT-C TOTAL SCORE: INCOMPLETE
HOW OFTEN DO YOU HAVE A DRINK CONTAINING ALCOHOL: 0

## 2020-11-27 NOTE — PATIENT INSTRUCTIONS
Personalized Preventive Plan for Jose Ramos - 11/27/2020  Medicare offers a range of preventive health benefits. Some of the tests and screenings are paid in full while other may be subject to a deductible, co-insurance, and/or copay. Some of these benefits include a comprehensive review of your medical history including lifestyle, illnesses that may run in your family, and various assessments and screenings as appropriate. After reviewing your medical record and screening and assessments performed today your provider may have ordered immunizations, labs, imaging, and/or referrals for you. A list of these orders (if applicable) as well as your Preventive Care list are included within your After Visit Summary for your review. Other Preventive Recommendations:    · A preventive eye exam performed by an eye specialist is recommended every 1-2 years to screen for glaucoma; cataracts, macular degeneration, and other eye disorders. · A preventive dental visit is recommended every 6 months. · Try to get at least 150 minutes of exercise per week or 10,000 steps per day on a pedometer . · Order or download the FREE \"Exercise & Physical Activity: Your Everyday Guide\" from [employer]. Call 9-540.122.6270 or search [employer] online. · You need 1761-9729 mg of calcium and 8033-3715 IU of vitamin D per day. It is possible to meet your calcium requirement with diet alone, but a vitamin D supplement is usually necessary to meet this goal.  · When exposed to the sun, use a sunscreen that protects against both UVA and UVB radiation with an SPF of 30 or greater. Reapply every 2 to 3 hours or after sweating, drying off with a towel, or swimming. · Always wear a seat belt when traveling in a car. Always wear a helmet when riding a bicycle or motorcycle.

## 2020-11-27 NOTE — PROGRESS NOTES
Medicare Annual Wellness Visit  Name: Marty Richardson Date: 2020   MRN: [id number] Sex: Female   Age: 76 y.o. Ethnicity: Non-/Non    : 1945 Race: Frederick Muñoz is here for Medicare AWV and Flu Vaccine (declines flu shot)    Screenings for behavioral, psychosocial and functional/safety risks, and cognitive dysfunction are all negative except as indicated below. These results, as well as other patient data from the [address] form, are documented in Flowsheets linked to this Encounter. Left flank pain over the summer; had normal CT in the ER. Pain resolved after a few months. Notes pressure in the lower abdomen when she has to empty her bladder x few mos. Has a hot flash when she has to urinate. No dysuria, hematuria, incontinence. No numbness of the groin. Has chronic lower back pain. Has heaviness in the legs if she walks too far. She rides bike and resistance training. Does core. Allergies   Allergen Reactions    Shellfish-Derived Products     Pcn [Penicillins] Rash     Outpatient Medications Marked as Taking for the 20 encounter (Office Visit) with Tushar Gonzalez MD   Medication Sig Dispense Refill    magnesium 200 MG TABS tablet Take 200 mg by mouth daily      Multiple Vitamins-Minerals (EYE SUPPORT) TABS Take  by mouth daily.  fish oil-omega-3 fatty acids 1000 MG capsule Take 500 mg by mouth daily       Coenzyme Q10 (CO Q 10 PO) Take 1 tablet by mouth daily.  Vitamin D (CHOLECALCIFEROL) 1000 UNITS CAPS capsule Take 2,500 Units by mouth daily       Multiple Vitamin (MULTI-VITAMIN PO) Take 1 tablet by mouth daily.         Cyanocobalamin (VITAMIN B-12) 1000 MCG SUBL Place 5 each under the tongue daily           Past Medical History:   Diagnosis Date    Adenomatous colon polyp 2015    Osteoarthritis 2011    Osteoarthritis, knee     Polyp of colon 2011    Tinnitus 2011       Past Surgical History:   Procedure Laterality Date    APPENDECTOMY      BREAST SURGERY      lumpectomy    CATARACT REMOVAL WITH IMPLANT      JOINT REPLACEMENT Left 12/2017    JOINT REPLACEMENT Bilateral 2013, 2014       Family History   Problem Relation Age of Onset    Stroke Mother 80        recovered    Other Mother         DVT    Tuberculosis Father 47    Diabetes Maternal Grandmother         lived to be 80    Osteoarthritis Brother     Colon Cancer Brother 67    Osteoarthritis Sister     Lung Cancer Sister 79    Cancer Sister 48        NMSC     Lab Results   Component Value Date     08/28/2019    K 4.2 08/28/2019     08/28/2019    CO2 29 08/28/2019    BUN 22 08/28/2019    CREATININE 0.94 08/28/2019    GLUCOSE 93 08/28/2019    CALCIUM 9.7 08/28/2019    PROT 7.3 02/24/2017    LABALBU 3.9 08/28/2019    BILITOT 0.7 08/28/2019    ALKPHOS 60 08/28/2019    AST 20 08/28/2019    ALT 14 08/28/2019    LABGLOM >60 02/24/2017    GFRAA >60 02/24/2017    AGRATIO 1.7 02/24/2017    GLOB 2.7 02/24/2017        Lab Results   Component Value Date    CHOL 235 08/28/2019    CHOL 183 10/14/2013    CHOL 211 (H) 11/06/2012     Lab Results   Component Value Date    TRIG 150 08/28/2019    TRIG 133 10/14/2013    TRIG 177 (H) 11/06/2012     Lab Results   Component Value Date    HDL 64 08/28/2019    HDL 67 (H) 10/14/2013    HDL 71 11/06/2012     Lab Results   Component Value Date    LDLCALC 141 08/28/2019    LDLCALC 89 10/14/2013    LDLCALC 105 (H) 11/06/2012     Lab Results   Component Value Date    LABVLDL 27 10/14/2013    LABVLDL 35 11/06/2012     No results found for: CHOLHDLRATIO      CareTeam (Including outside providers/suppliers regularly involved in providing care):   Patient Care Team:  Alta Rodgers MD as PCP - General (Family Medicine)  [facility] Rodgers [facility]    Wt Readings from Last 3 Encounters:   11/27/20 182 lb 6.4 oz (82.7 kg)   09/23/20 176 lb (79.8 kg)   07/01/20 175 lb 9.6 oz (79.7 kg)     Vitals:    11/27/20 0829 11/27/20 0911   BP: (!) 144/71 128/72   Pulse: 74    Resp: 16    Temp: 97.2 °F (36.2 °C)    TempSrc: Oral    SpO2: 99%    Weight: 182 lb 6.4 oz (82.7 kg)    Height: 5' 6.5\" (1.689 m)      Body mass index is 29 kg/m². Based upon direct observation of the patient, evaluation of cognition reveals recent and remote memory intact. General Appearance: alert and oriented to person, place and time, well developed and well- nourished, in no acute distress  Skin: warm and dry, no rash or erythema  Head: normocephalic and atraumatic  Eyes: pupils equal, round, and reactive to light, extraocular eye movements intact, conjunctivae normal  ENT: tympanic membrane, external ear and ear canal normal bilaterally, nose without deformity, nasal mucosa and turbinates normal without polyps  Neck: supple and non-tender without mass, no thyromegaly or thyroid nodules, no cervical lymphadenopathy  Pulmonary/Chest: clear to auscultation bilaterally- no wheezes, rales or rhonchi, normal air movement, no respiratory distress  Cardiovascular: normal rate, regular rhythm, normal S1 and S2, no murmurs, rubs, clicks, or gallops, distal pulses intact, no carotid bruits  Abdomen: soft, non-tender, non-distended, normal bowel sounds, no masses or organomegaly  Extremities: no cyanosis, clubbing or edema  Musculoskeletal: normal range of motion, no joint swelling, deformity or tenderness  Neurologic: reflexes normal and symmetric, no cranial nerve deficit, gait, coordination and speech normal    Patient's complete Health Risk Assessment and screening values have been reviewed and are found in Flowsheets. The following problems were reviewed today and where indicated follow up appointments were made and/or referrals ordered.     Positive Risk Factor Screenings with Interventions:     Hearing/Vision:  No exam data present  Hearing/Vision  Do you or your family notice any trouble with your hearing?: No  Do you have difficulty driving, watching TV, or doing any of your daily activities because of your eyesight?: No  Have you had an eye exam within the past year?: (!) No  Hearing/Vision Interventions:  · Vision concerns:  will make eye appt in January    Personalized Preventive Plan   Current Health Maintenance Status  Immunization History   Administered Date(s) Administered    Pneumococcal Conjugate 13-valent ([id number]) 11/30/2015    Pneumococcal Polysaccharide ([id number]) 02/28/2017    Td, unspecified formulation 01/01/2005    Zoster Live (Zostavax) 06/29/2010, 05/22/2013        Health Maintenance   Topic Date Due    DTaP/Tdap/Td vaccine (1 - Tdap) 05/18/1964    Shingles Vaccine (2 of 3) 07/17/2013    Annual Wellness Visit (AWV)  05/29/2019    Colon cancer screen colonoscopy  05/07/2020    Flu vaccine (1) 09/01/2020    Lipid screen  08/28/2024    Pneumococcal 65+ years Vaccine  Completed    Hepatitis C screen  Completed    Hepatitis A vaccine  Aged Out    Hepatitis B vaccine  Aged Out    Hib vaccine  Aged Out    Meningococcal (ACWY) vaccine  Aged Out     Recommendations for Stand Offer Due: see orders and patient instructions/AVS.  . Recommended screening schedule for the next 5-10 years is provided to the patient in written form: see Patient Wil Zhou was seen today for medicare awv and flu vaccine. Diagnoses and all orders for this visit:    Routine general medical examination at a health care facility   Flu vaccine declined. Concerns addressed. Benefit and risk discussed. Vaccine recommended    Hx of adenomatous polyp of colon  -     External Referral To Gastroenterology    Pure hypercholesterolemia  -     Comprehensive Metabolic Panel; Future  -     TSH with Reflex; Future  -     Lipid Panel; Future    Need for prophylactic vaccination against diphtheria-tetanus-pertussis (DTP)  -     Tdap (ADACEL) 5-2-15.5 LF-MCG/0.5 injection;  Inject 0.5 mLs into the muscle once for 1 dose    Need for prophylactic vaccination and inoculation against varicella  -     zoster recombinant adjuvanted vaccine [facility]) 50 MCG/0.5ML SUSR injection; Inject 0.5 mLs into the muscle once for 1 dose    Urinary frequency  -     POCT Urinalysis no Micro    Acute cystitis without hematuria  -     nitrofurantoin, macrocrystal-monohydrate, (MACROBID) 100 MG capsule; Take 1 capsule by mouth 2 times daily for 7 days  -     Culture, Urine    Recurrent UTI  -     estradiol (ESTRACE VAGINAL) 0.1 MG/GM vaginal cream; Apply pea sized amount to the urethra QHS  Prevention of UTIs discussed: drink plenty of fluids, avoid armando, avoid holding urine, double void, empty bladder before or after intercourse, eat yogurt on a daily basis. Side effects of current medications reviewed and questions answered. Follow up in 1 year or prn. Advance Care Planning   Advanced Care Planning: Discussed the patients choices for care and treatment in case of a health event that adversely affects decision-making abilities. Also discussed the patients long-term treatment options. Reviewed with the patient the [address] [place] [address] Declaration forms  Reviewed the process of designating a competent adult as an Agent (or -in-fact) that could take make health care decisions for the patient if incompetent. Patient was asked to complete the declaration forms, either acknowledge the forms by a public notary or an eligible witness and provide a signed copy to the practice office. Time spent (minutes): on file. No changes.

## 2020-11-30 LAB
ORGANISM: ABNORMAL
URINE CULTURE, ROUTINE: ABNORMAL

## 2020-12-01 LAB
ALBUMIN SERPL-MCNC: 4.3 G/DL (ref 3.5–5.7)
ALP BLD-CCNC: 67 U/L (ref 36–125)
ALT SERPL-CCNC: 21 U/L (ref 7–52)
ANION GAP SERPL CALCULATED.3IONS-SCNC: 6 MMOL/L (ref 3–16)
AST SERPL-CCNC: 19 U/L (ref 13–39)
BILIRUB SERPL-MCNC: 0.6 MG/DL (ref 0–1.5)
BUN BLDV-MCNC: 18 MG/DL (ref 7–25)
CALCIUM SERPL-MCNC: 9.6 MG/DL (ref 8.6–10.3)
CHLORIDE BLD-SCNC: 106 MMOL/L (ref 98–110)
CHOLESTEROL, TOTAL: 244 MG/DL (ref 0–200)
CO2: 31 MMOL/L (ref 21–33)
CREAT SERPL-MCNC: 1.01 MG/DL (ref 0.6–1.3)
GFR, ESTIMATED: 54 SEE NOTE.
GFR, ESTIMATED: 63 SEE NOTE.
GLUCOSE BLD-MCNC: 101 MG/DL (ref 70–100)
HDLC SERPL-MCNC: 70 MG/DL (ref 60–92)
LDL CHOLESTEROL CALCULATED: 132 MG/DL
OSMOLALITY CALCULATION: 298 MOSM/KG (ref 278–305)
POTASSIUM SERPL-SCNC: 4.7 MMOL/L (ref 3.5–5.3)
SODIUM BLD-SCNC: 143 MMOL/L (ref 133–146)
TOTAL PROTEIN: 7.3 G/DL (ref 6.4–8.9)
TRIGL SERPL-MCNC: 209 MG/DL (ref 10–149)
TSH, 3RD GENERATION: 1.56 UIU/ML (ref 0.45–4.12)

## 2021-04-07 ENCOUNTER — OFFICE VISIT (OUTPATIENT)
Dept: FAMILY MEDICINE CLINIC | Age: 76
End: 2021-04-07
Payer: MEDICARE

## 2021-04-07 VITALS
TEMPERATURE: 96.8 F | DIASTOLIC BLOOD PRESSURE: 75 MMHG | HEIGHT: 67 IN | SYSTOLIC BLOOD PRESSURE: 139 MMHG | WEIGHT: 184 LBS | BODY MASS INDEX: 28.88 KG/M2 | OXYGEN SATURATION: 97 % | HEART RATE: 80 BPM | RESPIRATION RATE: 20 BRPM

## 2021-04-07 DIAGNOSIS — N30.00 ACUTE CYSTITIS WITHOUT HEMATURIA: Primary | ICD-10-CM

## 2021-04-07 PROCEDURE — 99213 OFFICE O/P EST LOW 20 MIN: CPT | Performed by: FAMILY MEDICINE

## 2021-04-07 PROCEDURE — 81002 URINALYSIS NONAUTO W/O SCOPE: CPT | Performed by: FAMILY MEDICINE

## 2021-04-07 RX ORDER — SULFAMETHOXAZOLE AND TRIMETHOPRIM 800; 160 MG/1; MG/1
1 TABLET ORAL 2 TIMES DAILY
Qty: 14 TABLET | Refills: 0 | Status: SHIPPED | OUTPATIENT
Start: 2021-04-07 | End: 2021-04-14

## 2021-04-07 ASSESSMENT — PATIENT HEALTH QUESTIONNAIRE - PHQ9: SUM OF ALL RESPONSES TO PHQ QUESTIONS 1-9: 0

## 2021-04-07 NOTE — PROGRESS NOTES
Laterality Date    APPENDECTOMY      BREAST SURGERY      lumpectomy    CATARACT REMOVAL WITH IMPLANT      JOINT REPLACEMENT Left 12/2017    JOINT REPLACEMENT Bilateral 2013, 2014        Family History   Problem Relation Age of Onset    Stroke Mother 80        recovered    Other Mother         DVT    Tuberculosis Father 47    Diabetes Maternal Grandmother         lived to be 80    Osteoarthritis Brother     Colon Cancer Brother 67    Osteoarthritis Sister     Lung Cancer Sister 79    Cancer Sister 48        NMSC       Social History     Tobacco Use    Smoking status: Never Smoker    Smokeless tobacco: Never Used   Substance Use Topics    Alcohol use: No    Drug use: No            Review of Systems  Review of Systems    Objective:   Physical Exam  Vitals:    04/07/21 1041   BP: 139/75   Pulse: 80   Resp: 20   Temp: 96.8 °F (36 °C)   TempSrc: Temporal   SpO2: 97%   Weight: 184 lb (83.5 kg)   Height: 5' 7\" (1.702 m)       Physical Exam  NAD  Skin warm and dry. Chest is clear, no wheezing or rales. Normal symmetric air entry throughout both lung fields. Heart regular with normal rate, no murmer or gallop The abdomen is soft with mild suprabubic tenderness; no  guarding, mass, rebound or organomegaly. Bowel sounds are normal. No CVA tenderness or inguinal adenopathy noted. Assessment:       Diagnosis Orders   1. Acute cystitis without hematuria  POCT Urinalysis no Micro    sulfamethoxazole-trimethoprim (BACTRIM DS;SEPTRA DS) 800-160 MG per tablet          Plan:      Side effects of current medications reviewed and questions answered. .consider urology referral if cx negative.

## 2021-04-10 LAB
ORGANISM: ABNORMAL
URINE CULTURE, ROUTINE: ABNORMAL
URINE CULTURE, ROUTINE: ABNORMAL

## 2021-07-26 ENCOUNTER — PATIENT MESSAGE (OUTPATIENT)
Dept: FAMILY MEDICINE CLINIC | Age: 76
End: 2021-07-26

## 2021-07-26 DIAGNOSIS — L57.8 SUN-DAMAGED SKIN: Primary | ICD-10-CM

## 2021-07-26 NOTE — TELEPHONE ENCOUNTER
From: Ugo Lloyd  To: Morgan Ruiz MD  Sent: 7/26/2021 11:43 AM EDT  Subject: Non-Urgent Medical Question    Could I please get a referral to Dr. Jacob Chavez for an annual scan of moles. Due to Covid I am a year past due. I have an appt. scheduled for Oct. 4th, however I am on the wait list if there are any cancellations.  Thank you, Tanja Jarrod [phone]

## 2021-08-03 ENCOUNTER — OFFICE VISIT (OUTPATIENT)
Dept: DERMATOLOGY | Age: 76
End: 2021-08-03
Payer: MEDICARE

## 2021-08-03 VITALS — TEMPERATURE: 98.1 F

## 2021-08-03 DIAGNOSIS — L82.1 SEBORRHEIC KERATOSES: ICD-10-CM

## 2021-08-03 DIAGNOSIS — D22.9 BENIGN NEVUS: ICD-10-CM

## 2021-08-03 DIAGNOSIS — D48.5 NEOPLASM OF UNCERTAIN BEHAVIOR OF SKIN: Primary | ICD-10-CM

## 2021-08-03 PROCEDURE — 11102 TANGNTL BX SKIN SINGLE LES: CPT | Performed by: DERMATOLOGY

## 2021-08-03 PROCEDURE — 11103 TANGNTL BX SKIN EA SEP/ADDL: CPT | Performed by: DERMATOLOGY

## 2021-08-03 PROCEDURE — 99213 OFFICE O/P EST LOW 20 MIN: CPT | Performed by: DERMATOLOGY

## 2021-08-03 NOTE — PROGRESS NOTES
[facility]) Dermatology  Thelma Holland M.D.  [phone]       [facility]  [id number]    68 y.o. female     Date of Visit: 8/3/2021    Chief Complaint:   Chief Complaint   Patient presents with    Skin Lesion     wants sore spot on rt side of head onlt addressed        I was asked to see this patient by Dr. Chapa ref. provider found. History of Present Illness:  1. Total-body skin exam and new lesion    New plaque right temple at hairline-grew rapidly in the last few weeks. Raised, becoming easily traumatized. Multiple seborrheic keratoses over her torso that are asymptomatic-not itching, bleeding. Multiple nevi-stable in size, shape, color. Has not noticed any new or changing pigmented lesions. Skin Hx:  No PH skin cancer/ DN  5/17 left anterior thigh hypertrophic actinic keratosis treated with curettage  + sister with over 80 NMSC (immunosuppressed)    Review of Systems:  Constitutional: Reports general sense of well-being       Past Medical History, Surgical History, Family History, Medications and Allergies reviewed. Social History:   Social History     Socioeconomic History    Marital status:      Spouse name: Not on file    Number of children: Not on file    Years of education: Not on file    Highest education level: Not on file   Occupational History    Not on file   Tobacco Use    Smoking status: Never Smoker    Smokeless tobacco: Never Used   Vaping Use    Vaping Use: Never used   Substance and Sexual Activity    Alcohol use: No    Drug use: No    Sexual activity: Yes     Partners: Male   Other Topics Concern    Not on file   Social History Narrative    Not on file     Social Determinants of Health     Financial Resource Strain:     Difficulty of Paying Living Expenses:    Food Insecurity:     Worried About Running Out of Food in the Last Year:     [address] in the Last Year:    Transportation Needs:     Lack of Transportation (Medical):      Lack of Transportation (Non-Medical):    Physical Activity:     Days of Exercise per Week:     Minutes of Exercise per Session:    Stress:     Feeling of Stress :    Social Connections:     Frequency of Communication with Friends and Family:     Frequency of Social Gatherings with Friends and Family:     Attends [employer] Services:     Active Member of Clubs or Organizations:     Attends Club or Organization Meetings:     Marital Status:    Intimate Partner Violence:     Fear of Current or Ex-Partner:     Emotionally Abused:     Physically Abused:     Sexually Abused:        Physical Examination       -General: Well-appearing, NAD  1. Normal affect. Total body skin exam including scalp, face, neck, chest, abdomen, back, bilateral upper extremities, bilateral lower extremities, ocular conjunctiva, external lips, and nails was performed. Examination normal unless stated below. Underwear area not examined. Scattered on the trunk and extremities are multiple well-defined round and oval symmetric smoothly-bordered uniformly brown macules and papules. Multiple hyperkeratotic stuck on papules torso  Right temple at hairline 2.5 x 1.6 cm keratotic plaque-inflamed seborrheic keratosis rule out squamous cell carcinoma  Left lateral shoulder 1.5 cm erythematous plaque-hypertrophic actinic keratosis versus Adan's versus squamous cell carcinoma or inflamed seborrheic keratosis                Assessment and Plan     1. Neoplasm of uncertain behavior of skin-right temple, left lateral shoulder--Discussed possible diagnosis. Patient agreeable to biopsy. Verbal consent obtained after risks (infection, bleeding, scar), benefits and alternatives explained. -Area(s) to be biopsied were marked with a surgical pen. Site(s) were cleansed with alcohol. Local anesthesia achieved with 1% lidocaine with epinephrine/sodium bicarbonate. Shave biopsy performed with a razor blade. Hemostasis was achieved with aluminum chloride.  The wound(s) were dressed with petrolatum and covered with a bandage. Specimen(s) sent to pathology. Pt educated re: risk of bleeding, infection, scar and wound care instructions. 2. Seborrheic keratoses - Discussed underlying nature of seborrheic keratosis and low risk of malignancy. Treatment reserved for lesions that are itching, bleeding, growing or otherwise becoming bothersome. Discussed monitoring for change with reevaluation for changing lesions.      3. Benign nevus - Benign acquired melanocytic nevi  -Recommend monthly self skin exams   -Educated regarding the ABCDEs of melanoma detection   -Call for any new/changing moles or concerning lesions  -Reviewed sun protective behavior -- sun avoidance during the peak hours of the day, sun-protective clothing (including hat and sunglasses), sunscreen use (water resistant, broad spectrum, SPF at least 30, need for reapplication every 2 to 3 hours), avoidance of tanning beds

## 2021-08-05 LAB — DERMATOLOGY PATHOLOGY REPORT: NORMAL

## 2021-08-12 NOTE — PROGRESS NOTES
Subjective:      Patient ID: Radha Larios 68 y.o. female. is here for evaluation for dysuria. HPI. Dysuria, frequency, urgency x 4 days. Nocturia   Since she had diverticulitis one year ago she feels her bladder is not emptying completely, has a lot of pressure in the middle of the night. No hematuria, back pain, pelvic pain, fever. Rx;       Outpatient Medications Marked as Taking for the 8/16/21 encounter (Office Visit) with Jeimy Valverde MD   Medication Sig Dispense Refill    magnesium 200 MG TABS tablet Take 200 mg by mouth daily      Multiple Vitamins-Minerals (EYE SUPPORT) TABS Take  by mouth daily.  fish oil-omega-3 fatty acids 1000 MG capsule Take 500 mg by mouth daily       Coenzyme Q10 (CO Q 10 PO) Take 1 tablet by mouth daily.  Vitamin D (CHOLECALCIFEROL) 1000 UNITS CAPS capsule Take 2,500 Units by mouth daily       Multiple Vitamin (MULTI-VITAMIN PO) Take 1 tablet by mouth daily.         Cyanocobalamin (VITAMIN B-12) 1000 MCG SUBL Place 5 each under the tongue daily           Allergies   Allergen Reactions    Shellfish-Derived Products     Pcn [Penicillins] Rash       Patient Active Problem List   Diagnosis    Osteoarthritis    Lumbar disc disease    Osteoarthritis of hip    Edema    Varicose veins of leg with edema    Pulmonary nodules    Hx of adenomatous polyp of colon       Past Medical History:   Diagnosis Date    Adenomatous colon polyp 6/2015    Osteoarthritis 4/28/2011    Osteoarthritis, knee     Polyp of colon 4/28/2011    Tinnitus 4/28/2011       Past Surgical History:   Procedure Laterality Date    APPENDECTOMY      BREAST SURGERY      lumpectomy    CATARACT REMOVAL WITH IMPLANT      JOINT REPLACEMENT Left 12/2017    JOINT REPLACEMENT Bilateral 2013, 2014        Family History   Problem Relation Age of Onset    Stroke Mother 80        recovered    Other Mother         DVT    Tuberculosis Father 47    Diabetes Maternal Grandmother lived to be 80    Osteoarthritis Brother     Colon Cancer Brother 67    Osteoarthritis Sister     Lung Cancer Sister 79    Cancer Sister 48        NMSC       Social History     Tobacco Use    Smoking status: Never Smoker    Smokeless tobacco: Never Used   Vaping Use    Vaping Use: Never used   Substance Use Topics    Alcohol use: No    Drug use: No            Review of Systems  Review of Systems    Objective:   Physical Exam  Vitals:    08/16/21 1138   BP: 116/72   Pulse: 69   Resp: 12   Temp: 97.8 °F (36.6 °C)   TempSrc: Temporal   SpO2: 99%   Weight: 189 lb 6.4 oz (85.9 kg)       Physical Exam  NAD  Skin warm and dry. Chest is clear, no wheezing or rales. Normal symmetric air entry throughout both lung fields. Heart regular with normal rate, no murmer or gallop The abdomen is soft with mild suprabubic tenderness; no  guarding, mass, rebound or organomegaly. Bowel sounds are normal. No CVA tenderness or inguinal adenopathy noted. Assessment:       Diagnosis Orders   1. Dysuria  POCT Urinalysis no Micro   2. Need for COVID-19 vaccine     3. Acute cystitis without hematuria  nitrofurantoin, macrocrystal-monohydrate, (MACROBID) 100 MG capsule    Culture, Urine    4. Urinary frequency - try allow Aloe. Refer to urology if not effective. Plan:      Prevention of UTIs discussed: drink plenty of fluids, avoid armando, avoid holding urine, double void, empty bladder before or after intercourse, eat yogurt on a daily basis.

## 2021-08-16 ENCOUNTER — OFFICE VISIT (OUTPATIENT)
Dept: FAMILY MEDICINE CLINIC | Age: 76
End: 2021-08-16
Payer: MEDICARE

## 2021-08-16 VITALS
HEART RATE: 69 BPM | DIASTOLIC BLOOD PRESSURE: 72 MMHG | BODY MASS INDEX: 29.66 KG/M2 | OXYGEN SATURATION: 99 % | TEMPERATURE: 97.8 F | WEIGHT: 189.4 LBS | SYSTOLIC BLOOD PRESSURE: 116 MMHG | RESPIRATION RATE: 12 BRPM

## 2021-08-16 DIAGNOSIS — Z23 NEED FOR COVID-19 VACCINE: ICD-10-CM

## 2021-08-16 DIAGNOSIS — N30.00 ACUTE CYSTITIS WITHOUT HEMATURIA: ICD-10-CM

## 2021-08-16 DIAGNOSIS — R30.0 DYSURIA: Primary | ICD-10-CM

## 2021-08-16 LAB
BILIRUBIN, POC: ABNORMAL
BLOOD URINE, POC: ABNORMAL
CLARITY, POC: ABNORMAL
COLOR, POC: YELLOW
GLUCOSE URINE, POC: ABNORMAL
KETONES, POC: ABNORMAL
LEUKOCYTE EST, POC: ABNORMAL
NITRITE, POC: ABNORMAL
PH, POC: 6.5
PROTEIN, POC: ABNORMAL
SPECIFIC GRAVITY, POC: 1
UROBILINOGEN, POC: 0.2

## 2021-08-16 PROCEDURE — 99213 OFFICE O/P EST LOW 20 MIN: CPT | Performed by: FAMILY MEDICINE

## 2021-08-16 PROCEDURE — 81002 URINALYSIS NONAUTO W/O SCOPE: CPT | Performed by: FAMILY MEDICINE

## 2021-08-16 RX ORDER — NITROFURANTOIN 25; 75 MG/1; MG/1
100 CAPSULE ORAL 2 TIMES DAILY
Qty: 28 CAPSULE | Refills: 0 | Status: SHIPPED | OUTPATIENT
Start: 2021-08-16 | End: 2021-08-30

## 2021-08-16 SDOH — ECONOMIC STABILITY: FOOD INSECURITY: WITHIN THE PAST 12 MONTHS, YOU WORRIED THAT YOUR FOOD WOULD RUN OUT BEFORE YOU GOT MONEY TO BUY MORE.: NEVER TRUE

## 2021-08-16 SDOH — ECONOMIC STABILITY: FOOD INSECURITY: WITHIN THE PAST 12 MONTHS, THE FOOD YOU BOUGHT JUST DIDN'T LAST AND YOU DIDN'T HAVE MONEY TO GET MORE.: NEVER TRUE

## 2021-08-16 ASSESSMENT — SOCIAL DETERMINANTS OF HEALTH (SDOH): HOW HARD IS IT FOR YOU TO PAY FOR THE VERY BASICS LIKE FOOD, HOUSING, MEDICAL CARE, AND HEATING?: NOT HARD AT ALL

## 2021-08-18 LAB
ORGANISM: ABNORMAL
URINE CULTURE, ROUTINE: ABNORMAL
URINE CULTURE, ROUTINE: ABNORMAL

## 2021-09-22 ENCOUNTER — OFFICE VISIT (OUTPATIENT)
Dept: DERMATOLOGY | Age: 76
End: 2021-09-22
Payer: MEDICARE

## 2021-09-22 VITALS — TEMPERATURE: 97.1 F

## 2021-09-22 DIAGNOSIS — L82.0 SEBORRHEIC KERATOSES, INFLAMED: ICD-10-CM

## 2021-09-22 DIAGNOSIS — L57.0 KERATOSIS, ACTINIC: Primary | ICD-10-CM

## 2021-09-22 PROCEDURE — 17000 DESTRUCT PREMALG LESION: CPT | Performed by: DERMATOLOGY

## 2021-09-22 PROCEDURE — 17110 DESTRUCTION B9 LES UP TO 14: CPT | Performed by: DERMATOLOGY

## 2021-09-22 NOTE — PROGRESS NOTES
[facility]) Dermatology  Franko Light M.D.  [phone]       Eitan Cornelius      68 y.o. female     Date of Visit: 2021    Chief Complaint:   Chief Complaint   Patient presents with    Skin Lesion     temple and shoulder         I was asked to see this patient by Dr. Chapa ref. provider found. History of Present Illness:  1. Patient presents today for treatment of a biopsy-proven hypertrophic actinic keratosis left lateral shoulder-biopsy performed 8/3/2021. Patient healed without difficulty. She also had biopsy of a changing lesion right temple-verruca vulgaris. Clinically consistent with an inflamed seborrheic keratosis. Also has a new inflamed seborrheic keratosis left and right temple-pruritic. Under strap from mask, may be irritated by her mask. Skin Hx:  No PH skin cancer/ DN   left anterior thigh hypertrophic actinic keratosis treated with curettage  + sister with over Julio Noemy 1560 NMSC (immunosuppressed)  Review of Systems:  Constitutional: Reports general sense of well-being       Past Medical History, Surgical History, Family History, Medications and Allergies reviewed.     Social History:   Social History     Socioeconomic History    Marital status:      Spouse name: Not on file    Number of children: Not on file    Years of education: Not on file    Highest education level: Not on file   Occupational History    Not on file   Tobacco Use    Smoking status: Never Smoker    Smokeless tobacco: Never Used   Vaping Use    Vaping Use: Never used   Substance and Sexual Activity    Alcohol use: No    Drug use: No    Sexual activity: Yes     Partners: Male   Other Topics Concern    Not on file   Social History Narrative    Not on file     Social Determinants of Health     Financial Resource Strain: Low Risk     Difficulty of Paying Living Expenses: Not hard at all   Food Insecurity: No Food Insecurity    Worried About [address] [employer] [employer] in the Last Year: Never true  Ran Out of Food in the Last Year: Never true   Transportation Needs:     Lack of Transportation (Medical):  Lack of Transportation (Non-Medical):    Physical Activity:     Days of Exercise per Week:     Minutes of Exercise per Session:    Stress:     Feeling of Stress :    Social Connections:     Frequency of Communication with Friends and Family:     Frequency of Social Gatherings with Friends and Family:     Attends Pentecostalism Services:     Active Member of Clubs or Organizations:     Attends Club or Organization Meetings:     Marital Status:    Intimate Partner Violence:     Fear of Current or Ex-Partner:     Emotionally Abused:     Physically Abused:     Sexually Abused:        Physical Examination       -General: Well-appearing, NAD  1. Limited exam  Hyperkeratotic stuck on tan plaque right temple within hairline, right temple, left temple  Left lateral shoulder hypertrophic actinic keratosis with well-healed biopsy site-site confirmed with patient      Assessment and Plan     1. Keratosis, actinic -1-left lateral shoulder lesion(s) treated w/ liquid nitrogen. Edu re: risk of blister formation, discomfort, scar, hypopigmentation. Discussed wound care. 2. Seborrheic keratoses, inflamed -3-right temple x2, left temple lesion(s) treated w/ liquid nitrogen. Edu re: risk of blister formation, discomfort, scar, hypopigmentation. Discussed wound care.       Discussed need for ongoing treatment of larger, thicker verruca versus seborrheic keratosis right temple-return visit 6 to 8 weeks

## 2021-10-20 ENCOUNTER — OFFICE VISIT (OUTPATIENT)
Dept: DERMATOLOGY | Age: 76
End: 2021-10-20
Payer: MEDICARE

## 2021-10-20 VITALS — TEMPERATURE: 97.2 F

## 2021-10-20 DIAGNOSIS — L24.9 IRRITANT DERMATITIS: ICD-10-CM

## 2021-10-20 DIAGNOSIS — L82.0 SEBORRHEIC KERATOSES, INFLAMED: Primary | ICD-10-CM

## 2021-10-20 PROCEDURE — 99213 OFFICE O/P EST LOW 20 MIN: CPT | Performed by: DERMATOLOGY

## 2021-10-20 PROCEDURE — 17110 DESTRUCTION B9 LES UP TO 14: CPT | Performed by: DERMATOLOGY

## 2021-10-20 RX ORDER — TRIAMCINOLONE ACETONIDE 0.25 MG/G
OINTMENT TOPICAL
Qty: 30 G | Refills: 0 | Status: SHIPPED | OUTPATIENT
Start: 2021-10-20

## 2021-10-20 NOTE — PROGRESS NOTES
[facility]) Dermatology  Tiffanie Ruffin M.D.  [phone]       Martínez Coyle  [birth year]    68 y.o. female     Date of Visit: 10/20/2021    Chief Complaint:   Chief Complaint   Patient presents with    Rash     on face and scalp neck eyes are really bad         I was asked to see this patient by Dr. Chapa ref. provider found. History of Present Illness:  1. Patient presents today for evaluation of a rash-new involvement on her face, neck. Started with 1 small area on her cheek, then spread to her eyelids, cheeks, neck. Very pruritic. Has not tried any new products-no new soaps, lotions, sunscreen. Has not yet treated this. No history of glaucoma. Inflamed seborrheic keratosis right temple. Biopsy done 8/3/2021-verruca vulgaris. Treated with liquid nitrogen 9/22/2021. Skin Hx:  No PH skin cancer/ DN  5/17 left anterior thigh hypertrophic actinic keratosis treated with curettage  8/21 left lateral shoulder actinic keratosis hypertrophic    + sister with over 80 NMSC (immunosuppressed)    Review of Systems:  Constitutional: Reports general sense of well-being       Past Medical History, Surgical History, Family History, Medications and Allergies reviewed.     Social History:   Social History     Socioeconomic History    Marital status:      Spouse name: Not on file    Number of children: Not on file    Years of education: Not on file    Highest education level: Not on file   Occupational History    Not on file   Tobacco Use    Smoking status: Never Smoker    Smokeless tobacco: Never Used   Vaping Use    Vaping Use: Never used   Substance and Sexual Activity    Alcohol use: No    Drug use: No    Sexual activity: Yes     Partners: Male   Other Topics Concern    Not on file   Social History Narrative    Not on file     Social Determinants of Health     Financial Resource Strain: Low Risk     Difficulty of Paying Living Expenses: Not hard at all   Food Insecurity: No Food Insecurity  Worried About Running Out of Food in the Last Year: Never true    Nya of Food in the Last Year: Never true   Transportation Needs:     Lack of Transportation (Medical):  Lack of Transportation (Non-Medical):    Physical Activity:     Days of Exercise per Week:     Minutes of Exercise per Session:    Stress:     Feeling of Stress :    Social Connections:     Frequency of Communication with Friends and Family:     Frequency of Social Gatherings with Friends and Family:     Attends Anabaptist Services:     Active Member of Clubs or Organizations:     Attends Club or Organization Meetings:     Marital Status:    Intimate Partner Violence:     Fear of Current or Ex-Partner:     Emotionally Abused:     Physically Abused:     Sexually Abused:        Physical Examination       -General: Well-appearing, NAD  1. Well-developed well-nourished  Hyperkeratotic stuck on plaques right temple x2  Erythema, scale, lichenification upper eyelids, cheeks, hairline, occipital scalp, neck      Assessment and Plan     1. Seborrheic keratoses, inflamed -2-right temple lesion(s) treated w/ liquid nitrogen. Edu re: risk of blister formation, discomfort, scar, hypopigmentation. Discussed wound care. 2. Irritant dermatitis-versus allergic contact dermatitis, versus atopic dermatitis/psoriasis-triamcinolone 0.025% ointment twice daily for 1 week to her face including eyelids, occipital scalp, neck. Reviewed side effects, contraindications, proper application. Recheck at follow-up in 1 month.

## 2021-11-03 RX ORDER — PREDNISONE 10 MG/1
TABLET ORAL
Qty: 40 TABLET | Refills: 0 | Status: SHIPPED | OUTPATIENT
Start: 2021-11-03 | End: 2022-01-19

## 2021-11-29 ENCOUNTER — TELEPHONE (OUTPATIENT)
Dept: FAMILY MEDICINE CLINIC | Age: 76
End: 2021-11-29

## 2021-11-29 DIAGNOSIS — H01.116 ALLERGIC DERMATITIS OF EYELIDS OF BOTH EYES, UNSPECIFIED EYELID: Primary | ICD-10-CM

## 2021-11-29 DIAGNOSIS — H01.113 ALLERGIC DERMATITIS OF EYELIDS OF BOTH EYES, UNSPECIFIED EYELID: Primary | ICD-10-CM

## 2021-11-29 NOTE — TELEPHONE ENCOUNTER
Gokul Navarro called stating she was out of town for Thanksgiving and developed a terrible rash around her eyes. She called her ophthalmologist and was able to schedule an emergency appt today at 2:45 pm.  Her insurance requires a referral.  I let her know we have not seen her for the problem and won't have any dx code and that we also need at least 24 hours for a referral to be done. She wants to know if there is anything our office can to to help her out. I let her know I would send the message high priority but I cannot make any guarantee. Please advise. Thanks. Specialist name: Meryl Lemos         Date of appointment: TODAY at 2:45 pm    Reason for referral:rash around eyes, very itchy, small blisters    Diagnosis: n/a - new problem    Insurance Name: Tim [employer]        Insurance ID#: [id number]     Procedure Code: n/a         Specialist NPI# n/a    Specialist Tax ID# n//a      Gokul Navarro [phone]      Referrals require 7-10 business days notice for completion. It is the patient's responsibility to ensure the provider is in-network with their insurance company. If an insurance referral is required, authorization must be complete before the appointment or patient may be responsible for the bill.

## 2022-01-19 ENCOUNTER — PROCEDURE VISIT (OUTPATIENT)
Dept: DERMATOLOGY | Age: 77
End: 2022-01-19
Payer: MEDICARE

## 2022-01-19 VITALS — TEMPERATURE: 97 F

## 2022-01-19 DIAGNOSIS — L24.9 IRRITANT DERMATITIS: ICD-10-CM

## 2022-01-19 DIAGNOSIS — L82.0 SEBORRHEIC KERATOSES, INFLAMED: Primary | ICD-10-CM

## 2022-01-19 PROCEDURE — 99214 OFFICE O/P EST MOD 30 MIN: CPT | Performed by: DERMATOLOGY

## 2022-01-19 PROCEDURE — 17110 DESTRUCTION B9 LES UP TO 14: CPT | Performed by: DERMATOLOGY

## 2022-01-19 RX ORDER — PREDNISONE 10 MG/1
TABLET ORAL
Qty: 84 TABLET | Refills: 0 | Status: SHIPPED | OUTPATIENT
Start: 2022-01-19 | End: 2022-03-28 | Stop reason: ALTCHOICE

## 2022-01-19 RX ORDER — CLOBETASOL PROPIONATE 0.46 MG/ML
SOLUTION TOPICAL
Qty: 50 ML | Refills: 2 | Status: SHIPPED | OUTPATIENT
Start: 2022-01-19

## 2022-01-19 NOTE — PROGRESS NOTES
[facility]) Dermatology  Leonor Gavin M.D.  [phone]       Herve Kirby      68 y.o. female     Date of Visit: 2022    Chief Complaint:   Chief Complaint   Patient presents with    Skin Lesion     LN2 face    Rash     \"everywhere\"        I was asked to see this patient by Dr. Chapa ref. provider found. History of Present Illness:  1. Patient presents today for follow-up of rash and inflamed seborrheic keratoses. Rash started in October with erythematous scaly plaques scalp, face. Was treated with triamcinolone 0.025% ointment with minimal improvement-was then placed on a prednisone taper starting at 40 mg over 16 days. States that she had minimal relief from pruritus even on prednisone. Did try taking Benadryl which resulted in prolonged sedation. Remains very pruritic-having difficulty sleeping. No new products that she can identify-soap, shampoo, detergent. Has not colored her hair since early fall. Most bothered by involvement behind her right ear, occipital scalp. No involvement of elbows or knees. Denies history of hypertension or diabetes    Also notes multiple inflamed seborrheic keratoses along her hairline and scalp. Very pruritic. Have increased in size. Skin Hx:  No PH skin cancer/ DN   left anterior thigh hypertrophic actinic keratosis treated with curettage   left lateral shoulder actinic keratosis hypertrophic     + sister with over Julio  1560 NMSC (immunosuppressed)    Review of Systems:  Constitutional: Reports general sense of well-being       Past Medical History, Surgical History, Family History, Medications and Allergies reviewed.     Social History:   Social History     Socioeconomic History    Marital status:      Spouse name: Not on file    Number of children: Not on file    Years of education: Not on file    Highest education level: Not on file   Occupational History    Not on file   Tobacco Use    Smoking status: Never Smoker    Smokeless tobacco: Never Used   Vaping Use    Vaping Use: Never used   Substance and Sexual Activity    Alcohol use: No    Drug use: No    Sexual activity: Yes     Partners: Male   Other Topics Concern    Not on file   Social History Narrative    Not on file     Social Determinants of Health     Financial Resource Strain: Low Risk     Difficulty of Paying Living Expenses: Not hard at all   Food Insecurity: No Food Insecurity    Worried About Running Out of Food in the Last Year: Never true    [address] in the Last Year: Never true   Transportation Needs:     Lack of Transportation (Medical): Not on file    Lack of Transportation (Non-Medical): Not on file   Physical Activity:     Days of Exercise per Week: Not on file    Minutes of Exercise per Session: Not on file   Stress:     Feeling of Stress : Not on file   Social Connections:     Frequency of Communication with Friends and Family: Not on file    Frequency of Social Gatherings with Friends and Family: Not on file    Attends Episcopalian Services: Not on file    Active Member of [address] [place] [address] or Organizations: Not on file    Attends Club or Organization Meetings: Not on file    Marital Status: Not on file   Intimate Partner Violence:     Fear of Current or Ex-Partner: Not on file    Emotionally Abused: Not on file    Physically Abused: Not on file    Sexually Abused: Not on file   Housing Stability:     Unable to Pay for Housing in the Last Year: Not on file    Number of [place] in the Last Year: Not on file    Unstable Housing in the Last Year: Not on file       Physical Examination       -General: Well-appearing, NAD  1. Well-developed well-nourished  Fairly well demarcated erythematous scalp with slight scale right temporal scalp, occipital scalp. Scattered excoriations over her arms, multiple erythematous papules that are excoriated right upper abdomen. Minimal rash torso, arms, legs, but multiple excoriations.   No evidence of scabies finger webs. Multiple hyperkeratotic stuck on papules and plaques visibly erythematous along her hairline and anterior scalp    Assessment and Plan     1. Seborrheic keratoses, inflamed -6-hairline, anterior scalp lesion(s) treated w/ liquid nitrogen. Edu re: risk of blister formation, discomfort, scar, hypopigmentation. Discussed wound care. 2. Irritant dermatitis versus allergic contact dermatitis, psoriasis, eczema-chronic problem, not at treatment goal-prednisone 60 mg, 50 mg, 40 mg, 30 mg, 20 mg, 10 mg - 4 days of each. Start Zyrtec 10 mg p.o. daily. Clobetasol solution twice daily for 2 weeks scalp. Reviewed side effects and contraindications-particularly risk of infection on prednisone. Scheduled follow-up 3 to 4 weeks to ensure improvement.    Plan for future biopsy if not dramatically improved

## 2022-02-10 ENCOUNTER — OFFICE VISIT (OUTPATIENT)
Dept: DERMATOLOGY | Age: 77
End: 2022-02-10
Payer: MEDICARE

## 2022-02-10 VITALS — TEMPERATURE: 97.6 F

## 2022-02-10 DIAGNOSIS — L24.9 IRRITANT DERMATITIS: ICD-10-CM

## 2022-02-10 DIAGNOSIS — L82.0 SEBORRHEIC KERATOSES, INFLAMED: Primary | ICD-10-CM

## 2022-02-10 PROCEDURE — 99213 OFFICE O/P EST LOW 20 MIN: CPT | Performed by: DERMATOLOGY

## 2022-02-10 PROCEDURE — 17110 DESTRUCTION B9 LES UP TO 14: CPT | Performed by: DERMATOLOGY

## 2022-02-10 NOTE — PROGRESS NOTES
[facility] Dermatology  Surendra Baldwin M.D.  [phone]       Mamta Arreguin  [birth year]    68 y.o. female     Date of Visit: 2/10/2022    Chief Complaint:   Chief Complaint   Patient presents with    Rash     f/u- \"it's gone\"        I was asked to see this patient by Dr. Chapa ref. provider found. History of Present Illness:  1. Patient presents today for follow-up of irritant dermatitis versus eczematous dermatitis-completed course of prednisone currently at 10 mg daily. Started at 60 mg daily-did have headache and hot flashes at 60 mg daily for about 3 days that resolved without intervention. States that pruritus was gone within about 3 days. Never took Zyrtec. Has clobetasol solution but not currently using this. Very happy with her improvement    Inflamed seborrheic keratosis hairline right frontal scalp-treated previously with liquid nitrogen and improved but have not fully resolved      Skin Hx:  No PH skin cancer/ DN   left anterior thigh hypertrophic actinic keratosis treated with curettage   left lateral shoulder actinic keratosis hypertrophic     + sister with over Julio  1560 NMSC (immunosuppressed)       Review of Systems:  Constitutional: Reports general sense of well-being       Past Medical History, Surgical History, Family History, Medications and Allergies reviewed.     Social History:   Social History     Socioeconomic History    Marital status:      Spouse name: Not on file    Number of children: Not on file    Years of education: Not on file    Highest education level: Not on file   Occupational History    Not on file   Tobacco Use    Smoking status: Never Smoker    Smokeless tobacco: Never Used   Vaping Use    Vaping Use: Never used   Substance and Sexual Activity    Alcohol use: No    Drug use: No    Sexual activity: Yes     Partners: Male   Other Topics Concern    Not on file   Social History Narrative    Not on file     Social Determinants of Health Financial Resource Strain: Low Risk     Difficulty of Paying Living Expenses: Not hard at all   Food Insecurity: No Food Insecurity    Worried About Running Out of Food in the Last Year: Never true    Nya of Food in the Last Year: Never true   Transportation Needs:     Lack of Transportation (Medical): Not on file    Lack of Transportation (Non-Medical): Not on file   Physical Activity:     Days of Exercise per Week: Not on file    Minutes of Exercise per Session: Not on file   Stress:     Feeling of Stress : Not on file   Social Connections:     Frequency of Communication with Friends and Family: Not on file    Frequency of Social Gatherings with Friends and Family: Not on file    Attends Taoist Services: Not on file    Active Member of [address] [place] [address] [employer] or Organizations: Not on file    Attends Club or Organization Meetings: Not on file    Marital Status: Not on file   Intimate Partner Violence:     Fear of Current or Ex-Partner: Not on file    Emotionally Abused: Not on file    Physically Abused: Not on file    Sexually Abused: Not on file   Housing Stability:     Unable to Pay for Housing in the Last Year: Not on file    Number of [place] in the Last Year: Not on file    Unstable Housing in the Last Year: Not on file       Physical Examination       -General: Well-appearing, NAD  1. Multiple hyperkeratotic stuck on relatively thin tan papules right frontal hairline  No active rash face, scalp, arm      Assessment and Plan     1. Seborrheic keratoses, inflamed -5 right frontal hairline-   lesion(s) treated w/ liquid nitrogen. Edu re: risk of blister formation, discomfort, scar, hypopigmentation. Discussed wound care. 2. Irritant dermatitis-currently improved, monitor for recurrence. Discussed starting clobetasol solution twice daily as needed avoiding face if she develops a recurrence. If she develops a recurrence that does not resolve, consider biopsy.

## 2022-03-27 NOTE — PROGRESS NOTES
Subjective:      Patient ID: Miladys Pelayo 68 y.o. female. The primary encounter diagnosis was Pelvic pain. A diagnosis of Need for vaccination was also pertinent to this visit. HPI      Woke up in the AM 3 days ago with aching pain in the lower pelvis and lower back. No dysuria.  + frequency, urgency. Denies hematuria, vaginal bleeding or discharge. The patient denies abdominal or flank pain, anorexia, nausea or vomiting, dysphagia, change in bowel habits or black or bloody stools or weight loss. She had fever 100. LMP 11/2020 and 8/2021. She drinks plenty of water. Had a rash over her entire body in January; she was on several courses of steroids for this. Lab Results   Component Value Date     12/01/2020    K 4.7 12/01/2020     12/01/2020    CO2 31 12/01/2020    BUN 18 12/01/2020    CREATININE 1.01 12/01/2020    GLUCOSE 101 (H) 12/01/2020    CALCIUM 9.6 12/01/2020    PROT 7.3 12/01/2020    LABALBU 4.3 12/01/2020    BILITOT 0.6 12/01/2020    ALKPHOS 67 12/01/2020    AST 19 12/01/2020    ALT 21 12/01/2020    LABGLOM >60 02/24/2017    GFRAA >60 02/24/2017    AGRATIO 1.7 02/24/2017    GLOB 2.7 02/24/2017       Lab Results   Component Value Date    WBC 6.5 11/30/2015    HGB 12.8 11/30/2015    HCT 39.7 11/30/2015    MCV 92.7 11/30/2015     11/30/2015      EXAM: CT ABDOMEN AND PELVIS WITH IV CONTRAST       INDICATION: L flank pain, ? abscess,        TECHNIQUE: CT of the abdomen and pelvis was performed after the administration of IV contrast.    Axial images were obtained with coronal and sagittal reconstructions.       CONTRAST: 125 mL of IOHEXOL 350 MG IODINE/ML INTRAVENOUS SOLUTION administered intravenously       FIELD OF VIEW: 36 cm       COMPARISON: Pelvic ultrasound 2/15/2019.       FINDINGS:       Lower chest: Unremarkable.       ABDOMEN:       Liver: Hypodense lesion in the dome of the right hepatic lobe, which likely represents a cyst.       Biliary tree: Unremarkable.       Spleen: Unremarkable.       Pancreas: Unremarkable.       Adrenal glands: Unremarkable.       Kidneys/ureters/bladder: Bilateral renal cortical cysts. Urinary bladder is unremarkable.       Gastrointestinal tract: Colonic diverticulosis. No focal bowel thickening or obstruction.       Lymphatics: Unremarkable.       Vasculature: Mild calcific atherosclerotic changes.       Peritoneum/Retroperitoneum: Unremarkable.       Abdominal wall/soft tissues: Tiny fat-containing umbilical hernia.       Genital Structures: Ovaries are unremarkable. Uterus is not visualized due to beam hardening    from bilateral hip prostheses.       Osseous structures: No acute osseous abnormality. Multilevel degenerative changes visualized    spine.       125   IMPRESSION:       1.  Colonic diverticulosis without diverticulitis. Outpatient Medications Marked as Taking for the 3/28/22 encounter (Office Visit) with Karri Crowley MD   Medication Sig Dispense Refill    clobetasol (TEMOVATE) 0.05 % external solution Apply BID for up to 2 weeks 50 mL 2    [DISCONTINUED] predniSONE (DELTASONE) 10 MG tablet 6 tabs PO QD for 4 days, 5 tabs PO QD for 4 days, 4 tabs PO QD for 4 days, 3 tabs PO QD for 4 days, 2 tabs PO QD for 4 days, 1 tab PO QD for 4 days 84 tablet 0    triamcinolone (KENALOG) 0.025 % ointment Apply BID for up to 1 week 30 g 0    estradiol (ESTRACE VAGINAL) 0.1 MG/GM vaginal cream Apply pea sized amount to the urethra QHS 1 Tube 3    magnesium 200 MG TABS tablet Take 200 mg by mouth daily      Multiple Vitamins-Minerals (EYE SUPPORT) TABS Take  by mouth daily.  fish oil-omega-3 fatty acids 1000 MG capsule Take 500 mg by mouth daily       Coenzyme Q10 (CO Q 10 PO) Take 1 tablet by mouth daily.  Vitamin D (CHOLECALCIFEROL) 1000 UNITS CAPS capsule Take 2,500 Units by mouth daily       Multiple Vitamin (MULTI-VITAMIN PO) Take 1 tablet by mouth daily.         Cyanocobalamin (VITAMIN B-12) [address] 5 each under the tongue daily           Allergies   Allergen Reactions    Shellfish-Derived Products     Pcn [Penicillins] Rash       Patient Active Problem List   Diagnosis    Osteoarthritis    Lumbar disc disease    Osteoarthritis of hip    Edema    Varicose veins of leg with edema    Pulmonary nodules    Hx of adenomatous polyp of colon       Past Medical History:   Diagnosis Date    Adenomatous colon polyp 6/2015    Osteoarthritis 4/28/2011    Osteoarthritis, knee     Polyp of colon 4/28/2011    Tinnitus 4/28/2011       Past Surgical History:   Procedure Laterality Date    APPENDECTOMY      BREAST SURGERY      lumpectomy    CATARACT REMOVAL WITH IMPLANT      JOINT REPLACEMENT Left 12/2017    JOINT REPLACEMENT Bilateral 2013, 2014        Family History   Problem Relation Age of Onset    Stroke Mother 80        recovered    Other Mother         DVT    Tuberculosis Father 47    Diabetes Maternal Grandmother         lived to be 80    Osteoarthritis Brother     Colon Cancer Brother 67    Osteoarthritis Sister     Lung Cancer Sister 79    Cancer Sister 48        NMSC       Social History     Tobacco Use    Smoking status: Never Smoker    Smokeless tobacco: Never Used   Vaping Use    Vaping Use: Never used   Substance Use Topics    Alcohol use: No    Drug use: No            Review of Systems  Review of Systems    Objective:   Physical Exam  Vitals:    03/28/22 1435   BP: 132/88   Pulse: 71   Resp: 12   Temp: 97.6 °F (36.4 °C)   TempSrc: Temporal   SpO2: 92%   Weight: 196 lb 3.2 oz (89 kg)       Physical Exam  NAD  Skin warm and dry. Chest is clear, no wheezing or rales. Normal symmetric air entry throughout both lung fields. Heart regular with normal rate, no murmer or gallop The abdomen is soft with mild suprabubic tenderness; no  guarding, mass, rebound or organomegaly. Bowel sounds are normal. No CVA tenderness or inguinal adenopathy noted.       Assessment: Diagnosis Orders   1. Pelvic pain  POCT Urinalysis no Micro   2. Acute cystitis without hematuria  nitrofurantoin, macrocrystal-monohydrate, (MACROBID) 100 MG capsule    Culture, Urine          Plan:      Side effects of current medications reviewed and questions answered. Call or return to clinic prn if these symptoms worsen or fail to improve as anticipated.

## 2022-03-28 ENCOUNTER — OFFICE VISIT (OUTPATIENT)
Dept: FAMILY MEDICINE CLINIC | Age: 77
End: 2022-03-28
Payer: MEDICARE

## 2022-03-28 VITALS
RESPIRATION RATE: 12 BRPM | BODY MASS INDEX: 30.73 KG/M2 | OXYGEN SATURATION: 92 % | TEMPERATURE: 97.6 F | SYSTOLIC BLOOD PRESSURE: 132 MMHG | WEIGHT: 196.2 LBS | DIASTOLIC BLOOD PRESSURE: 88 MMHG | HEART RATE: 71 BPM

## 2022-03-28 DIAGNOSIS — N30.00 ACUTE CYSTITIS WITHOUT HEMATURIA: ICD-10-CM

## 2022-03-28 DIAGNOSIS — R10.2 PELVIC PAIN: Primary | ICD-10-CM

## 2022-03-28 LAB
BILIRUBIN, POC: ABNORMAL
BLOOD URINE, POC: ABNORMAL
CLARITY, POC: CLEAR
COLOR, POC: YELLOW
GLUCOSE URINE, POC: ABNORMAL
KETONES, POC: ABNORMAL
LEUKOCYTE EST, POC: ABNORMAL
NITRITE, POC: ABNORMAL
PH, POC: 6
PROTEIN, POC: ABNORMAL
SPECIFIC GRAVITY, POC: 1.02
UROBILINOGEN, POC: 0.2

## 2022-03-28 PROCEDURE — 99213 OFFICE O/P EST LOW 20 MIN: CPT | Performed by: FAMILY MEDICINE

## 2022-03-28 PROCEDURE — 81002 URINALYSIS NONAUTO W/O SCOPE: CPT | Performed by: FAMILY MEDICINE

## 2022-03-28 RX ORDER — NITROFURANTOIN 25; 75 MG/1; MG/1
100 CAPSULE ORAL 2 TIMES DAILY
Qty: 14 CAPSULE | Refills: 0 | Status: SHIPPED | OUTPATIENT
Start: 2022-03-28 | End: 2022-04-04

## 2022-03-31 LAB
ORGANISM: ABNORMAL
URINE CULTURE, ROUTINE: ABNORMAL

## 2022-05-30 SDOH — HEALTH STABILITY: PHYSICAL HEALTH: ON AVERAGE, HOW MANY DAYS PER WEEK DO YOU ENGAGE IN MODERATE TO STRENUOUS EXERCISE (LIKE A BRISK WALK)?: 5 DAYS

## 2022-05-30 SDOH — HEALTH STABILITY: PHYSICAL HEALTH: ON AVERAGE, HOW MANY MINUTES DO YOU ENGAGE IN EXERCISE AT THIS LEVEL?: 50 MIN

## 2022-05-30 ASSESSMENT — LIFESTYLE VARIABLES
HOW MANY STANDARD DRINKS CONTAINING ALCOHOL DO YOU HAVE ON A TYPICAL DAY: 1
HOW OFTEN DO YOU HAVE SIX OR MORE DRINKS ON ONE OCCASION: 1
HOW OFTEN DO YOU HAVE A DRINK CONTAINING ALCOHOL: MONTHLY OR LESS
HOW MANY STANDARD DRINKS CONTAINING ALCOHOL DO YOU HAVE ON A TYPICAL DAY: 1 OR 2
HOW OFTEN DO YOU HAVE A DRINK CONTAINING ALCOHOL: 2

## 2022-05-30 ASSESSMENT — PATIENT HEALTH QUESTIONNAIRE - PHQ9
1. LITTLE INTEREST OR PLEASURE IN DOING THINGS: 0
SUM OF ALL RESPONSES TO PHQ QUESTIONS 1-9: 0
SUM OF ALL RESPONSES TO PHQ QUESTIONS 1-9: 0
2. FEELING DOWN, DEPRESSED OR HOPELESS: 0
SUM OF ALL RESPONSES TO PHQ QUESTIONS 1-9: 0
SUM OF ALL RESPONSES TO PHQ9 QUESTIONS 1 & 2: 0
SUM OF ALL RESPONSES TO PHQ QUESTIONS 1-9: 0

## 2022-05-31 ENCOUNTER — OFFICE VISIT (OUTPATIENT)
Dept: FAMILY MEDICINE CLINIC | Age: 77
End: 2022-05-31
Payer: MEDICARE

## 2022-05-31 VITALS
WEIGHT: 191.4 LBS | BODY MASS INDEX: 30.04 KG/M2 | RESPIRATION RATE: 14 BRPM | SYSTOLIC BLOOD PRESSURE: 138 MMHG | HEIGHT: 67 IN | OXYGEN SATURATION: 100 % | TEMPERATURE: 97.8 F | HEART RATE: 64 BPM | DIASTOLIC BLOOD PRESSURE: 86 MMHG

## 2022-05-31 DIAGNOSIS — Z23 NEED FOR PROPHYLACTIC VACCINATION AND INOCULATION AGAINST VARICELLA: ICD-10-CM

## 2022-05-31 DIAGNOSIS — K21.9 GASTROESOPHAGEAL REFLUX DISEASE, UNSPECIFIED WHETHER ESOPHAGITIS PRESENT: ICD-10-CM

## 2022-05-31 DIAGNOSIS — Z01.00 ROUTINE EYE EXAM: ICD-10-CM

## 2022-05-31 DIAGNOSIS — Z00.00 MEDICARE ANNUAL WELLNESS VISIT, SUBSEQUENT: Primary | ICD-10-CM

## 2022-05-31 DIAGNOSIS — R60.0 PEDAL EDEMA: ICD-10-CM

## 2022-05-31 DIAGNOSIS — Z23 NEED FOR COVID-19 VACCINE: ICD-10-CM

## 2022-05-31 DIAGNOSIS — Z23 NEED FOR PROPHYLACTIC VACCINATION AGAINST DIPHTHERIA-TETANUS-PERTUSSIS (DTP): ICD-10-CM

## 2022-05-31 LAB
ALBUMIN SERPL-MCNC: 4.1 G/DL (ref 3.5–5.7)
ALP BLD-CCNC: 73 U/L (ref 36–125)
ALT SERPL-CCNC: 18 U/L (ref 7–52)
ANION GAP SERPL CALCULATED.3IONS-SCNC: 9 MMOL/L (ref 3–16)
AST SERPL-CCNC: 19 U/L (ref 13–39)
BASOPHILS ABSOLUTE: 41 /UL (ref 0–200)
BASOPHILS RELATIVE PERCENT: 0.8 % (ref 0–1)
BILIRUB SERPL-MCNC: 0.4 MG/DL (ref 0–1.5)
BUN BLDV-MCNC: 17 MG/DL (ref 7–25)
CALCIUM SERPL-MCNC: 9.6 MG/DL (ref 8.6–10.3)
CHLORIDE BLD-SCNC: 105 MMOL/L (ref 98–110)
CO2: 29 MMOL/L (ref 21–33)
CREAT SERPL-MCNC: 1.12 MG/DL (ref 0.6–1.3)
EOSINOPHILS ABSOLUTE: 214 /UL (ref 15–500)
EOSINOPHILS RELATIVE PERCENT: 4.2 % (ref 0–8)
GFR, ESTIMATED: 51
GLUCOSE BLD-MCNC: 100 MG/DL (ref 70–100)
HCT VFR BLD CALC: 37.1 % (ref 35–45)
HEMOGLOBIN: 12.3 G/DL (ref 11.7–15.5)
LYMPHOCYTES ABSOLUTE: 2009 /UL (ref 850–3900)
LYMPHOCYTES RELATIVE PERCENT: 39.4 % (ref 15–45)
MCH RBC QN AUTO: 30 PG (ref 27–33)
MCHC RBC AUTO-ENTMCNC: 33.1 G/DL (ref 32–36)
MCV RBC AUTO: 90.6 FL (ref 80–100)
MONOCYTES ABSOLUTE: 541 /UL (ref 200–950)
MONOCYTES RELATIVE PERCENT: 10.6 % (ref 0–12)
NEUTROPHILS ABSOLUTE: 2295 /UL (ref 1500–7800)
NUCLEATED RED BLOOD CELLS: 0 /100 WBC (ref 0–0)
OSMOLALITY CALCULATION: 298 MOSM/KG (ref 278–305)
PDW BLD-RTO: 14.4 % (ref 11–15)
PLATELET # BLD: 216 10E3/UL (ref 140–400)
PMV BLD AUTO: 7.6 FL (ref 7.5–11.5)
POTASSIUM SERPL-SCNC: 4.2 MMOL/L (ref 3.5–5.3)
RBC # BLD: 4.1 10E6/UL (ref 3.8–5.1)
SEGMENTED NEUTROPHILS RELATIVE PERCENT: 45 % (ref 40–80)
SODIUM BLD-SCNC: 143 MMOL/L (ref 133–146)
TOTAL PROTEIN: 6.8 G/DL (ref 6.4–8.9)
TSH, 3RD GENERATION: 1.3 UIU/ML (ref 0.45–4.12)
WBC # BLD: 5.1 10E3/UL (ref 3.8–10.8)

## 2022-05-31 PROCEDURE — 99214 OFFICE O/P EST MOD 30 MIN: CPT | Performed by: FAMILY MEDICINE

## 2022-05-31 PROCEDURE — G0439 PPPS, SUBSEQ VISIT: HCPCS | Performed by: FAMILY MEDICINE

## 2022-05-31 PROCEDURE — 1123F ACP DISCUSS/DSCN MKR DOCD: CPT | Performed by: FAMILY MEDICINE

## 2022-05-31 NOTE — PATIENT INSTRUCTIONS
Patient Education        Carpal Tunnel Syndrome: Exercises  Introduction  Here are some examples of exercises for you to try. The exercises may be suggested for a condition or for rehabilitation. Start each exercise slowly. Ease off the exercises if you start to have pain. You will be told when to start these exercises and which ones will work bestfor you. Warm-up stretches  When you no longer have pain or numbness, you can do exercises to help prevent carpal tunnel syndrome from coming back. Do not do any stretch or movement thatis uncomfortable or painful. 1. Rotate your wrist up, down, and from side to side. Repeat 4 times. 2. Stretch your fingers far apart. Relax them, and then stretch them again. Repeat 4 times. 3. Stretch your thumb by pulling it back gently, holding it, and then releasing it. Repeat 4 times. How to do the exercises  Prayer stretch    1. Start with your palms together in front of your chest just below your chin. 2. Slowly lower your hands toward your waistline, keeping your hands close to your stomach and your palms together until you feel a mild to moderate stretch under your forearms. 3. Hold for at least 15 to 30 seconds. Repeat 2 to 4 times. Wrist flexor stretch    1. Extend your arm in front of you with your palm up. 2. Bend your wrist, pointing your hand toward the floor. 3. With your other hand, gently bend your wrist farther until you feel a mild to moderate stretch in your forearm. 4. Hold for at least 15 to 30 seconds. Repeat 2 to 4 times. Wrist extensor stretch    1. Repeat steps 1 through 4 of the stretch above, but begin with your extended hand palm down. Follow-up care is a key part of your treatment and safety. Be sure to make and go to all appointments, and call your doctor if you are having problems. It's also a good idea to know your test results and keep alist of the medicines you take. Where can you learn more? Go to [email or web address] org and sign in to your [employer] account. Enter S998 in the [facility] box to learn more about \"Carpal Tunnel Syndrome: Exercises. \"     If you do not have an account, please click on the \"Sign Up Now\" link. Current as of: July 1, 2021               Content Version: 13.2  © 2323-4405 [employer]. Care instructions adapted under license by [facility] ([facility]). If you have questions about a medical condition or this instruction, always ask your healthcare professional. [place] [address] any warranty or liability for your use of this information. Personalized Preventive Plan for Pippa Needle - 5/31/2022  Medicare offers a range of preventive health benefits. Some of the tests and screenings are paid in full while other may be subject to a deductible, co-insurance, and/or copay. Some of these benefits include a comprehensive review of your medical history including lifestyle, illnesses that may run in your family, and various assessments and screenings as appropriate. After reviewing your medical record and screening and assessments performed today your provider may have ordered immunizations, labs, imaging, and/or referrals for you. A list of these orders (if applicable) as well as your Preventive Care list are included within your After Visit Summary for your review. Other Preventive Recommendations:    · A preventive eye exam performed by an eye specialist is recommended every 1-2 years to screen for glaucoma; cataracts, macular degeneration, and other eye disorders. · A preventive dental visit is recommended every 6 months. · Try to get at least 150 minutes of exercise per week or 10,000 steps per day on a pedometer . · Order or download the FREE \"Exercise & Physical Activity: Your Everyday Guide\" from [employer]. Call 9-706.791.5335 or search [employer] online.   · You need 6897-2917 mg of calcium and 7290-1260 IU of vitamin D per day. It is possible to meet your calcium requirement with diet alone, but a vitamin D supplement is usually necessary to meet this goal.  · When exposed to the sun, use a sunscreen that protects against both UVA and UVB radiation with an SPF of 30 or greater. Reapply every 2 to 3 hours or after sweating, drying off with a towel, or swimming. · Always wear a seat belt when traveling in a car. Always wear a helmet when riding a bicycle or motorcycle.

## 2022-05-31 NOTE — PROGRESS NOTES
Medicare Annual Wellness Visit    Michael Hou is here for No chief complaint on file. Assessment & Plan   Medicare annual wellness visit, subsequent  Pedal edema  -     Comprehensive Metabolic Panel; Future  -     TSH with Reflex; Future  Continue low Na diet and support hose. Previously saw Dr. Gypsy Snellen who did not think there was a surgical option. Doppler reviewed. Gastroesophageal reflux disease, unspecified whether esophagitis present  -     CBC with Auto Differential; Future  Antireflux measures addressed   Need for prophylactic vaccination against diphtheria-tetanus-pertussis (DTP)  -     Tdap (ADACEL) 5-2-15.5 LF-MCG/0.5 injection; Inject 0.5 mLs into the muscle once for 1 dose, Disp-0.5 mL, R-0Print  Need for prophylactic vaccination and inoculation against varicella  -     zoster recombinant adjuvanted vaccine [facility]) 50 MCG/0.5ML SUSR injection; Inject 0.5 mLs into the muscle once for 1 dose, Disp-0.5 mL, R-0Print  Routine eye exam  -     Ambulatory referral to Ophthalmology  Need for COVID-19 vaccine  Encouraged vaccination. Recommendations for Preventive Services Due: see orders and patient instructions/AVS.  Recommended screening schedule for the next 5-10 years is provided to the patient in written form: see Patient Instructions/AVS.     No follow-ups on file. Subjective   The following acute and/or chronic problems were also addressed today:    GERD: 3 episodes over 6 weeks. Lasted a few minutes; burning in the chest.  Was upright. No HS symptoms, dysphagia. No NSAIDS    Numbness in the left hand for 6 to 8 months. Occurs typically when at rest or in the AM.  Not progressive and no weakness. Denies neck pain. Cannot localize the numbness to one side of the hand    Swelling legs x few months. Support hose not helping much. Not better in the AM.  No PND or orthopnea. No NSAIDS. Eats a low Na diet.      Lab Results   Component Value Date     12/01/2020    K 4.7 12/01/2020     12/01/2020    CO2 31 12/01/2020    BUN 18 12/01/2020    CREATININE 1.01 12/01/2020    GLUCOSE 101 12/01/2020    CALCIUM 9.6 12/01/2020       Lab Results   Component Value Date    WBC 6.5 11/30/2015    HGB 12.8 11/30/2015    HCT 39.7 11/30/2015    MCV 92.7 11/30/2015     11/30/2015        Patient's complete Health Risk Assessment and screening values have been reviewed and are found in Flowsheets. The following problems were reviewed today and where indicated follow up appointments were made and/or referrals ordered. Positive Risk Factor Screenings with Interventions:              Health Habits/Nutrition:     Physical Activity: Sufficiently Active    Days of Exercise per Week: 5 days    Minutes of Exercise per Session: 50 min     Have you lost any weight without trying in the past 3 months?: No  Body mass index: (!) 30.43  Have you seen the dentist within the past year?: (!) No    Health Habits/Nutrition Interventions:  · Dental exam overdue:  patient encouraged to make appointment with his/her dentist             Objective   Vitals:    05/31/22 0912   BP: 138/86   Pulse: 64   Resp: 14   Temp: 97.8 °F (36.6 °C)   TempSrc: Temporal   SpO2: 100%   Weight: 191 lb 6.4 oz (86.8 kg)   Height: 5' 6.5\" (1.689 m)      Body mass index is 30.43 kg/m².      Wt Readings from Last 3 Encounters:   05/31/22 191 lb 6.4 oz (86.8 kg)   03/28/22 196 lb 3.2 oz (89 kg)   08/16/21 189 lb 6.4 oz (85.9 kg)         General Appearance: alert and oriented to person, place and time, well developed and well- nourished, in no acute distress  Skin: warm and dry, no rash or erythema  Head: normocephalic and atraumatic  Eyes: pupils equal, round, and reactive to light, extraocular eye movements intact, conjunctivae normal  ENT: tympanic membrane, external ear and ear canal normal bilaterally, nose without deformity, nasal mucosa and turbinates normal without polyps  Neck: supple and non-tender without mass, no thyromegaly or thyroid nodules, no cervical lymphadenopathy  Pulmonary/Chest: clear to auscultation bilaterally- no wheezes, rales or rhonchi, normal air movement, no respiratory distress  Cardiovascular: normal rate, regular rhythm, normal S1 and S2, no murmurs, rubs, clicks, or gallops, distal pulses intact, no carotid bruits  Abdomen: soft, non-tender, non-distended, normal bowel sounds, no masses or organomegaly  Extremities: no cyanosis, clubbing. + varicose veins. Trace edema left, 1+ right. Musculoskeletal: normal range of motion, no joint swelling, deformity or tenderness  Neurologic: reflexes normal and symmetric, no cranial nerve deficit, gait, coordination and speech normal   Exam for Carpal Tunnel syndrome shows bilateral negative - Tinel and Phalen tests are negative, normal sensation, no atrophy. Allergies   Allergen Reactions    Shellfish-Derived Products     Pcn [Penicillins] Rash     Prior to Visit Medications    Medication Sig Taking? Authorizing Provider   clobetasol (TEMOVATE) 0.05 % external solution Apply BID for up to 2 weeks Yes Lindsey Morris MD   triamcinolone (KENALOG) 0.025 % ointment Apply BID for up to 1 week Yes Lindsey Morris MD   estradiol (ESTRACE VAGINAL) 0.1 MG/GM vaginal cream Apply pea sized amount to the urethra QHS Yes Abraham Granados MD   magnesium 200 MG TABS tablet Take 200 mg by mouth daily Yes Historical Provider, MD   Multiple Vitamins-Minerals (EYE SUPPORT) TABS Take  by mouth daily. Yes Historical Provider, MD   fish oil-omega-3 fatty acids 1000 MG capsule Take 500 mg by mouth daily  Yes Historical Provider, MD   Coenzyme Q10 (CO Q 10 PO) Take 1 tablet by mouth daily. Yes Historical Provider, MD   Vitamin D (CHOLECALCIFEROL) 1000 UNITS CAPS capsule Take 2,500 Units by mouth daily  Yes Historical Provider, MD   Multiple Vitamin (MULTI-VITAMIN PO) Take 1 tablet by mouth daily.    Yes Historical Provider, MD   Cyanocobalamin (VITAMIN B-12) 1000 MCG SUBL Place 5 each under the tongue daily  Yes Historical Provider, MD White (Including outside providers/suppliers regularly involved in providing care):   Patient Care Team:  Valerio Napoles MD as PCP - General (Family Medicine)  Valerio Napoles MD as PCP - [facility] Empaneled Provider     Reviewed and updated this visit:  Tobacco  Allergies  Meds  Problems  Med Hx  Surg Hx  Soc Hx  Fam Hx                  Advance Care Planning   Advanced Care Planning: Discussed the patients choices for care and treatment in case of a health event that adversely affects decision-making abilities. Also discussed the patients long-term treatment options. Reviewed with the patient the appropriate state-specific advance directive documents. Reviewed the process of designating a competent adult as an Agent (or -in-fact) that could take make health care decisions for the patient if incompetent. Patient was asked to complete the declaration forms, either acknowledge the forms by a public notary or an eligible witness and provide a signed copy to the practice office. Time spent (minutes): 2   On file. No change. Obesity Counseling: Assessed behavioral health risks and factors affecting choice of behavior. Suggested weight control approaches, including dietary changes behavioral modification and follow up plan. Provided educational and support documentation. Time spent (minutes): 2     Cardiovascular Disease Risk Counseling: Assessed the patient's risk to develop cardiovascular disease and reviewed main risk factors.    Reviewed steps to reduce disease risk including:   · Quitting tobacco use, reducing amount smoked, or not starting the habit  · Making healthy food choices  · Being physically active and gradualy increasing activity levels   · Reduce weight and determine a healthy BMI goal  · Monitor blood pressure and treat if higher than 140/90 mmHg  · Maintain blood total cholesterol levels under 5 mmol/l or 190 mg/dl  · Maintain LDL cholesterol levels under 3.0 mmol/l or 115 mg/dl   · Control blood glucose levels  ·   Provided a follow up plan.   Time spent (minutes): 5

## 2022-06-01 RX ORDER — POTASSIUM CHLORIDE 750 MG/1
10 TABLET, EXTENDED RELEASE ORAL DAILY PRN
Qty: 30 TABLET | Refills: 1 | Status: SHIPPED | OUTPATIENT
Start: 2022-06-01

## 2022-06-01 RX ORDER — FUROSEMIDE 20 MG/1
20 TABLET ORAL DAILY PRN
Qty: 30 TABLET | Refills: 1 | Status: SHIPPED | OUTPATIENT
Start: 2022-06-01

## 2022-06-02 ENCOUNTER — PATIENT MESSAGE (OUTPATIENT)
Dept: FAMILY MEDICINE CLINIC | Age: 77
End: 2022-06-02

## 2022-06-02 DIAGNOSIS — N18.2 CHRONIC RENAL INSUFFICIENCY, STAGE 2 (MILD): Primary | ICD-10-CM

## 2022-06-02 NOTE — TELEPHONE ENCOUNTER
From: Maty Gray  To: Dr. Zachery Lagos: 6/2/2022 7:25 AM EDT  Subject: Follow up question    Good morning Dr. Jamin Murray, I received my bloodwork, and one entry concerns me a lot. My GFR is only 46 which from my understanding is very low, and indicates a sign of kidney disease. Am I correct in my understanding that the number should be at least 60 or above? Also, is taking the Lasik going to do more harm?

## 2022-06-06 NOTE — TELEPHONE ENCOUNTER
Pt requesting to have bloodwork done    Dr. Kathryn Britton,  thank you for helping me understand my concern for my kidneys. I will only take the Lasix as needed, and repeat the blood work in six weeks as you recommend. I only live a mile from the [facility] which is very convenient,  so if you could send the work order there I would greatly appreciate it. Thank you so much.   Ovi Lloyd

## 2022-07-08 ENCOUNTER — PATIENT MESSAGE (OUTPATIENT)
Dept: FAMILY MEDICINE CLINIC | Age: 77
End: 2022-07-08

## 2022-07-08 NOTE — TELEPHONE ENCOUNTER
From: Day Means  To: Dr. Desi Tuckerr: 7/8/2022 9:11 AM EDT  Subject: blood work    Good morning, On my last visit with Dr. Jean Lopez she requested I do follow-up blood work in six weeks. I ask that the order be sent to [facility]. I received confirmation that is was indeed sent. However, I went there this morning which is six weeks later, and they have no order. Can you tell me where it was sent , or can you please resend it to that lab? [address] [place] [address]. Thank you for your help.  Medina Vaca 9-

## 2022-07-11 LAB
ANION GAP SERPL CALCULATED.3IONS-SCNC: 10 MMOL/L (ref 3–16)
BUN BLDV-MCNC: 14 MG/DL (ref 7–25)
CALCIUM SERPL-MCNC: 9.4 MG/DL (ref 8.6–10.3)
CHLORIDE BLD-SCNC: 105 MMOL/L (ref 98–110)
CO2: 28 MMOL/L (ref 21–33)
CREAT SERPL-MCNC: 0.97 MG/DL (ref 0.6–1.3)
GFR, ESTIMATED: 60
GLUCOSE BLD-MCNC: 106 MG/DL (ref 70–100)
OSMOLALITY CALCULATION: 297 MOSM/KG (ref 278–305)
POTASSIUM SERPL-SCNC: 4.2 MMOL/L (ref 3.5–5.3)
SODIUM BLD-SCNC: 143 MMOL/L (ref 133–146)

## 2022-08-05 ENCOUNTER — OFFICE VISIT (OUTPATIENT)
Dept: FAMILY MEDICINE CLINIC | Age: 77
End: 2022-08-05
Payer: MEDICARE

## 2022-08-05 VITALS
SYSTOLIC BLOOD PRESSURE: 126 MMHG | TEMPERATURE: 97.1 F | OXYGEN SATURATION: 100 % | BODY MASS INDEX: 29.95 KG/M2 | HEART RATE: 79 BPM | DIASTOLIC BLOOD PRESSURE: 60 MMHG | RESPIRATION RATE: 14 BRPM | WEIGHT: 188.4 LBS

## 2022-08-05 DIAGNOSIS — L24.9 IRRITANT CONTACT DERMATITIS, UNSPECIFIED TRIGGER: Primary | ICD-10-CM

## 2022-08-05 PROCEDURE — 99213 OFFICE O/P EST LOW 20 MIN: CPT | Performed by: FAMILY MEDICINE

## 2022-08-05 PROCEDURE — 1123F ACP DISCUSS/DSCN MKR DOCD: CPT | Performed by: FAMILY MEDICINE

## 2022-08-05 RX ORDER — MOMETASONE FUROATE 1 MG/G
CREAM TOPICAL
Qty: 15 G | Refills: 0 | Status: SHIPPED | OUTPATIENT
Start: 2022-08-05

## 2022-08-05 NOTE — PROGRESS NOTES
Subjective:      Patient ID: Camilla Marin 68 y.o. female. is here for evaluation for rash      HPI    Rash on the right cheek  Has not been outdoors. Has been sitting in the hospital with her . Very itchy, oozing clear fluid. Does not want oral steroids - made her angry and irritable. Dermatitis on neck, chest, face, back since May. Resolved with steroids but came back. Has appt with Dr. Catie Keller next month. Outpatient Medications Marked as Taking for the 8/5/22 encounter (Office Visit) with Sheridan Morgan MD   Medication Sig Dispense Refill    furosemide (LASIX) 20 MG tablet Take 1 tablet by mouth daily as needed (edema) 30 tablet 1    potassium chloride (KLOR-CON M) 10 MEQ extended release tablet Take 1 tablet by mouth daily as needed (take when taking lasix.) 30 tablet 1    clobetasol (TEMOVATE) 0.05 % external solution Apply BID for up to 2 weeks 50 mL 2    triamcinolone (KENALOG) 0.025 % ointment Apply BID for up to 1 week 30 g 0    estradiol (ESTRACE VAGINAL) 0.1 MG/GM vaginal cream Apply pea sized amount to the urethra QHS 1 Tube 3    magnesium 200 MG TABS tablet Take 200 mg by mouth daily      Multiple Vitamins-Minerals (EYE SUPPORT) TABS Take  by mouth daily. fish oil-omega-3 fatty acids 1000 MG capsule Take 500 mg by mouth daily       Coenzyme Q10 (CO Q 10 PO) Take 1 tablet by mouth daily. Vitamin D (CHOLECALCIFEROL) 1000 UNITS CAPS capsule Take 2,500 Units by mouth daily       Multiple Vitamin (MULTI-VITAMIN PO) Take 1 tablet by mouth daily.         Cyanocobalamin (VITAMIN B-12) 1000 MCG SUBL Place 5 each under the tongue daily           Allergies   Allergen Reactions    Shellfish-Derived Products     Pcn [Penicillins] Rash       Patient Active Problem List   Diagnosis    Osteoarthritis    Lumbar disc disease    Osteoarthritis of hip    Edema    Varicose veins of leg with edema    Pulmonary nodules    Hx of adenomatous polyp of colon       Past Medical History: Diagnosis Date    Adenomatous colon polyp 6/2015    Osteoarthritis 4/28/2011    Osteoarthritis, knee     Polyp of colon 4/28/2011    Tinnitus 4/28/2011       Past Surgical History:   Procedure Laterality Date    APPENDECTOMY      BREAST SURGERY      lumpectomy    CATARACT REMOVAL WITH IMPLANT      JOINT REPLACEMENT Left 12/2017    JOINT REPLACEMENT Bilateral 2013, 2014        Family History   Problem Relation Age of Onset    Stroke Mother 80        recovered    Other Mother         DVT    Tuberculosis Father 47    Diabetes Maternal Grandmother         lived to be 80    Osteoarthritis Brother     Colon Cancer Brother 67    Osteoarthritis Sister     Lung Cancer Sister 79    Cancer Sister 48        NMSC       Social History     Tobacco Use    Smoking status: Never    Smokeless tobacco: Never   Vaping Use    Vaping Use: Never used   Substance Use Topics    Alcohol use: No    Drug use: No            Review of Systems  Review of Systems    Objective:   Physical Exam  Vitals:    08/05/22 1043   BP: 126/60   Pulse: 79   Resp: 14   Temp: 97.1 °F (36.2 °C)   TempSrc: Temporal   SpO2: 100%   Weight: 188 lb 6.4 oz (85.5 kg)       Physical Exam  Constitutional:       General: She is not in acute distress. Appearance: She is well-developed. Cardiovascular:      Rate and Rhythm: Normal rate and regular rhythm. Heart sounds: Normal heart sounds. Pulmonary:      Effort: Pulmonary effort is normal.      Breath sounds: Normal breath sounds. Skin:     General: Skin is warm and dry. Findings: Rash present. Comments: erythematous maculopapular rash right > left cheek     No induration     Assessment:       Diagnosis Orders   1. Irritant contact dermatitis, unspecified trigger  mometasone (ELOCON) 0.1 % cream             Plan:      Side effects of current medications reviewed and questions answered.  Follow up if not improving 5 days or if not resolved 7-10 d.

## 2022-09-08 ENCOUNTER — OFFICE VISIT (OUTPATIENT)
Dept: DERMATOLOGY | Age: 77
End: 2022-09-08
Payer: MEDICARE

## 2022-09-08 DIAGNOSIS — L82.0 SEBORRHEIC KERATOSES, INFLAMED: Primary | ICD-10-CM

## 2022-09-08 DIAGNOSIS — L20.84 INTRINSIC ECZEMA: ICD-10-CM

## 2022-09-08 PROCEDURE — 99214 OFFICE O/P EST MOD 30 MIN: CPT | Performed by: DERMATOLOGY

## 2022-09-08 PROCEDURE — 17110 DESTRUCTION B9 LES UP TO 14: CPT | Performed by: DERMATOLOGY

## 2022-09-08 PROCEDURE — 1123F ACP DISCUSS/DSCN MKR DOCD: CPT | Performed by: DERMATOLOGY

## 2022-09-08 NOTE — PROGRESS NOTES
History Narrative    Not on file     Social Determinants of Health     Financial Resource Strain: Not on file   Food Insecurity: Not on file   Transportation Needs: Not on file   Physical Activity: Sufficiently Active    Days of Exercise per Week: 5 days    Minutes of Exercise per Session: 50 min   Stress: Not on file   Social Connections: Not on file   Intimate Partner Violence: Not on file   Housing Stability: Not on file       Physical Examination       -General: Well-appearing, NAD  1. Postinflammatory erythema right cheek. No active dermatitis. Hyperkeratotic stuck on papules right neck      Assessment and Plan     1. Seborrheic keratoses, inflamed - After the risks, complications, and alternatives were explained to the patient, including risk of blister formation, discomfort, scar, hypopigmentation, patient elected to proceed with cryotherapy. 3 lesion(s) on the right neck were treated w/ liquid nitrogen using a Cryogun. 1 freeze thaw cycle was performed with a freeze time of 2-5 seconds. There were no immediate complications. Wound care instructions were discussed with the patient. 2. Intrinsic eczema -gentle soaps, moisturize regularly. Mometasone per primary care doctor twice daily for no more than a week right cheek-clobetasol solution twice daily up to 2 weeks scalp, shoulders.   Reviewed proper use and side effects     Follow-up and total-body skin exam 1 to 2 months

## 2022-12-09 ENCOUNTER — TELEPHONE (OUTPATIENT)
Dept: FAMILY MEDICINE CLINIC | Age: 77
End: 2022-12-09

## 2022-12-09 DIAGNOSIS — Z01.419 ENCOUNTER FOR ANNUAL ROUTINE GYNECOLOGICAL EXAMINATION: Primary | ICD-10-CM

## 2022-12-09 NOTE — TELEPHONE ENCOUNTER
----- Message from Alondra Adkins sent at 12/9/2022  1:29 PM EST -----  Subject: Referral Request    Reason for referral request? Patient needs to have a referral sent to her   gynocologist. Please send Imer Guajardo who is her gynocologist  Provider patient wants to be referred to(if known):     Provider Phone Number(if known):     Additional Information for Provider?   ---------------------------------------------------------------------------  --------------  [id number] [employer]    [phone]; OK to leave message on voicemail  ---------------------------------------------------------------------------  --------------

## 2022-12-12 NOTE — TELEPHONE ENCOUNTER
She needs a new GYN because her old one retired. She has an appt at the [place] location on Thursday. Dr.Emily Reeves   [facility] Office:  [phone] [address] [facility], [place]  Fax: [phone]    Thank you

## 2023-01-17 ENCOUNTER — OFFICE VISIT (OUTPATIENT)
Dept: FAMILY MEDICINE CLINIC | Age: 78
End: 2023-01-17
Payer: MEDICARE

## 2023-01-17 VITALS
HEART RATE: 81 BPM | BODY MASS INDEX: 30.21 KG/M2 | SYSTOLIC BLOOD PRESSURE: 138 MMHG | OXYGEN SATURATION: 99 % | WEIGHT: 190 LBS | DIASTOLIC BLOOD PRESSURE: 60 MMHG | TEMPERATURE: 97.9 F | RESPIRATION RATE: 14 BRPM

## 2023-01-17 DIAGNOSIS — Z91.89 AT RISK FOR CORONARY ARTERY DISEASE: ICD-10-CM

## 2023-01-17 DIAGNOSIS — G43.109 OPHTHALMIC MIGRAINE: ICD-10-CM

## 2023-01-17 DIAGNOSIS — R35.0 FREQUENT URINATION: Primary | ICD-10-CM

## 2023-01-17 DIAGNOSIS — N30.00 ACUTE CYSTITIS WITHOUT HEMATURIA: ICD-10-CM

## 2023-01-17 PROCEDURE — 99214 OFFICE O/P EST MOD 30 MIN: CPT | Performed by: FAMILY MEDICINE

## 2023-01-17 PROCEDURE — 81002 URINALYSIS NONAUTO W/O SCOPE: CPT | Performed by: FAMILY MEDICINE

## 2023-01-17 PROCEDURE — 1123F ACP DISCUSS/DSCN MKR DOCD: CPT | Performed by: FAMILY MEDICINE

## 2023-01-17 RX ORDER — SULFAMETHOXAZOLE AND TRIMETHOPRIM 800; 160 MG/1; MG/1
1 TABLET ORAL 2 TIMES DAILY
Qty: 10 TABLET | Refills: 0 | Status: SHIPPED | OUTPATIENT
Start: 2023-01-17 | End: 2023-01-22

## 2023-01-17 SDOH — ECONOMIC STABILITY: FOOD INSECURITY: WITHIN THE PAST 12 MONTHS, THE FOOD YOU BOUGHT JUST DIDN'T LAST AND YOU DIDN'T HAVE MONEY TO GET MORE.: NEVER TRUE

## 2023-01-17 SDOH — ECONOMIC STABILITY: FOOD INSECURITY: WITHIN THE PAST 12 MONTHS, YOU WORRIED THAT YOUR FOOD WOULD RUN OUT BEFORE YOU GOT MONEY TO BUY MORE.: NEVER TRUE

## 2023-01-17 ASSESSMENT — PATIENT HEALTH QUESTIONNAIRE - PHQ9
SUM OF ALL RESPONSES TO PHQ9 QUESTIONS 1 & 2: 0
SUM OF ALL RESPONSES TO PHQ QUESTIONS 1-9: 0
2. FEELING DOWN, DEPRESSED OR HOPELESS: 0
1. LITTLE INTEREST OR PLEASURE IN DOING THINGS: 0
SUM OF ALL RESPONSES TO PHQ QUESTIONS 1-9: 0

## 2023-01-17 ASSESSMENT — SOCIAL DETERMINANTS OF HEALTH (SDOH): HOW HARD IS IT FOR YOU TO PAY FOR THE VERY BASICS LIKE FOOD, HOUSING, MEDICAL CARE, AND HEATING?: NOT HARD AT ALL

## 2023-01-17 NOTE — PROGRESS NOTES
Subjective:      Patient ID: Melissa Sharpe 68 y.o. female. is here for evaluation for dysuria. HPI    Few days urgency, nausea. Maya Salen on and pain in the left lower back.   + dysuria   denies fever, vomiting, vaginal discharge or bleeding, constipation, diarrhea. She drinks plenty of fluids. Rx;  None    Last + urine cx 3/28/22.  + klebsiella. Eye changes:  saw a kaleidescope of colors in the left visual field. Lasted about 5 minutes. She had no HA afterwards.     She saw eye doctor and had a normal exam.  Is concerned because her mom had a stroke at age 80    The 10-year ASCVD risk score (Kimberlyn DK, et al., 2019) is: 23%    Values used to calculate the score:      Age: 68 years      Sex: Female      Is Non- : No      Diabetic: No      Tobacco smoker: No      Systolic Blood Pressure: 885 mmHg      Is BP treated: No      HDL Cholesterol: 70 mg/dL      Total Cholesterol: 244 mg/dL     Lab Results   Component Value Date/Time     07/11/2022 08:10 AM    K 4.2 07/11/2022 08:10 AM     07/11/2022 08:10 AM    CO2 28 07/11/2022 08:10 AM    BUN 14 07/11/2022 08:10 AM    CREATININE 0.97 07/11/2022 08:10 AM    GLUCOSE 106 07/11/2022 08:10 AM    CALCIUM 9.4 07/11/2022 08:10 AM       Lab Results   Component Value Date    CHOL 244 (H) 12/01/2020    CHOL 235 08/28/2019    CHOL 183 10/14/2013     Lab Results   Component Value Date    TRIG 209 (H) 12/01/2020    TRIG 150 08/28/2019    TRIG 133 10/14/2013     Lab Results   Component Value Date    HDL 70 12/01/2020    HDL 64 08/28/2019    HDL 67 (H) 10/14/2013     Lab Results   Component Value Date    LDLCALC 132 12/01/2020    LDLCALC 141 08/28/2019    LDLCALC 89 10/14/2013     Lab Results   Component Value Date    LABVLDL 27 10/14/2013    LABVLDL 35 11/06/2012     No results found for: [facility]   Outpatient Medications Marked as Taking for the 1/17/23 encounter (Office Visit) with Jamarucs Ramirez MD   Medication Sig Dispense Refill mometasone (ELOCON) 0.1 % cream Apply topically daily. 15 g 0    furosemide (LASIX) 20 MG tablet Take 1 tablet by mouth daily as needed (edema) 30 tablet 1    potassium chloride (KLOR-CON M) 10 MEQ extended release tablet Take 1 tablet by mouth daily as needed (take when taking lasix.) 30 tablet 1    clobetasol (TEMOVATE) 0.05 % external solution Apply BID for up to 2 weeks 50 mL 2    triamcinolone (KENALOG) 0.025 % ointment Apply BID for up to 1 week 30 g 0    estradiol (ESTRACE VAGINAL) 0.1 MG/GM vaginal cream Apply pea sized amount to the urethra QHS 1 Tube 3    magnesium 200 MG TABS tablet Take 200 mg by mouth daily      Multiple Vitamins-Minerals (EYE SUPPORT) TABS Take  by mouth daily. fish oil-omega-3 fatty acids 1000 MG capsule Take 500 mg by mouth daily       Coenzyme Q10 (CO Q 10 PO) Take 1 tablet by mouth daily. Vitamin D (CHOLECALCIFEROL) 1000 UNITS CAPS capsule Take 2,500 Units by mouth daily       Multiple Vitamin (MULTI-VITAMIN PO) Take 1 tablet by mouth daily.         Cyanocobalamin (VITAMIN B-12) 1000 MCG SUBL Place 5 each under the tongue daily           Allergies   Allergen Reactions    Shellfish-Derived Products     Pcn [Penicillins] Rash       Patient Active Problem List   Diagnosis    Osteoarthritis    Lumbar disc disease    Osteoarthritis of hip    Edema    Varicose veins of leg with edema    Pulmonary nodules    Hx of adenomatous polyp of colon       Past Medical History:   Diagnosis Date    Adenomatous colon polyp 6/2015    Osteoarthritis 4/28/2011    Osteoarthritis, knee     Polyp of colon 4/28/2011    Tinnitus 4/28/2011       Past Surgical History:   Procedure Laterality Date    APPENDECTOMY      BREAST SURGERY      lumpectomy    CATARACT REMOVAL WITH IMPLANT      JOINT REPLACEMENT Left 12/2017    JOINT REPLACEMENT Bilateral 2013, 2014        Family History   Problem Relation Age of Onset    Stroke Mother 80        recovered    Other Mother         DVT    Tuberculosis Father 47    Diabetes Maternal Grandmother         lived to be 80    Osteoarthritis Brother     Colon Cancer Brother 67    Osteoarthritis Sister     Lung Cancer Sister 79    Cancer Sister 48        NMSC       Social History     Tobacco Use    Smoking status: Never    Smokeless tobacco: Never   Vaping Use    Vaping Use: Never used   Substance Use Topics    Alcohol use: No    Drug use: No            Review of Systems  Review of Systems    Objective:   Physical Exam  Vitals:    01/17/23 1401   BP: 138/60   Pulse: 81   Resp: 14   Temp: 97.9 °F (36.6 °C)   TempSrc: Temporal   SpO2: 99%   Weight: 190 lb (86.2 kg)       Physical Exam    NAD  Skin warm and dry. Chest is clear, no wheezing or rales. Normal symmetric air entry throughout both lung fields. Heart regular with normal rate, no murmer or gallop The abdomen is soft with mild suprabubic tenderness; no  guarding, mass, rebound or organomegaly. Bowel sounds are normal. No CVA tenderness or inguinal adenopathy noted. Mild LUQ tenderness     Assessment:       Diagnosis Orders   1. Frequent urination  POCT Urinalysis no Micro    Culture, Urine      2. Acute cystitis without hematuria  sulfamethoxazole-trimethoprim (BACTRIM DS) 800-160 MG per tablet      3. Ophthalmic migraine  Addressed, reassured. 4. At risk for coronary artery disease  CT cardiac score, statin addressed. Diet and exercise addresed. Plan:      Side effects of current medications reviewed and questions answered.

## 2023-01-19 LAB
ORGANISM: ABNORMAL
URINE CULTURE, ROUTINE: ABNORMAL

## 2023-07-06 ENCOUNTER — OFFICE VISIT (OUTPATIENT)
Dept: PRIMARY CARE CLINIC | Age: 78
End: 2023-07-06
Payer: MEDICARE

## 2023-07-06 VITALS
BODY MASS INDEX: 30.57 KG/M2 | DIASTOLIC BLOOD PRESSURE: 80 MMHG | HEIGHT: 66 IN | HEART RATE: 72 BPM | RESPIRATION RATE: 16 BRPM | WEIGHT: 190.2 LBS | SYSTOLIC BLOOD PRESSURE: 130 MMHG | TEMPERATURE: 97.9 F | OXYGEN SATURATION: 97 %

## 2023-07-06 DIAGNOSIS — I25.10 ASCVD (ARTERIOSCLEROTIC CARDIOVASCULAR DISEASE): ICD-10-CM

## 2023-07-06 DIAGNOSIS — E78.5 HYPERLIPIDEMIA, UNSPECIFIED HYPERLIPIDEMIA TYPE: ICD-10-CM

## 2023-07-06 DIAGNOSIS — R60.1 GENERALIZED EDEMA: ICD-10-CM

## 2023-07-06 DIAGNOSIS — R60.9 PERIPHERAL EDEMA: ICD-10-CM

## 2023-07-06 DIAGNOSIS — Z76.89 ENCOUNTER TO ESTABLISH CARE: Primary | ICD-10-CM

## 2023-07-06 DIAGNOSIS — E55.9 VITAMIN D DEFICIENCY: ICD-10-CM

## 2023-07-06 DIAGNOSIS — H53.9 VISION CHANGES: ICD-10-CM

## 2023-07-06 DIAGNOSIS — I83.892 VARICOSE VEINS OF LEFT LOWER EXTREMITY WITH EDEMA: ICD-10-CM

## 2023-07-06 PROBLEM — M17.12 UNILATERAL PRIMARY OSTEOARTHRITIS, LEFT KNEE: Status: ACTIVE | Noted: 2017-12-13

## 2023-07-06 PROBLEM — R60.0 PERIPHERAL EDEMA: Status: ACTIVE | Noted: 2023-07-06

## 2023-07-06 PROCEDURE — 99203 OFFICE O/P NEW LOW 30 MIN: CPT | Performed by: FAMILY MEDICINE

## 2023-07-06 PROCEDURE — 1123F ACP DISCUSS/DSCN MKR DOCD: CPT | Performed by: FAMILY MEDICINE

## 2023-07-06 RX ORDER — FUROSEMIDE 20 MG/1
20 TABLET ORAL DAILY PRN
Qty: 30 TABLET | Refills: 1 | Status: SHIPPED | OUTPATIENT
Start: 2023-07-06

## 2023-07-06 SDOH — ECONOMIC STABILITY: FOOD INSECURITY: WITHIN THE PAST 12 MONTHS, THE FOOD YOU BOUGHT JUST DIDN'T LAST AND YOU DIDN'T HAVE MONEY TO GET MORE.: NEVER TRUE

## 2023-07-06 SDOH — ECONOMIC STABILITY: HOUSING INSECURITY
IN THE LAST 12 MONTHS, WAS THERE A TIME WHEN YOU DID NOT HAVE A STEADY PLACE TO SLEEP OR SLEPT IN A SHELTER (INCLUDING NOW)?: NO

## 2023-07-06 SDOH — ECONOMIC STABILITY: FOOD INSECURITY: WITHIN THE PAST 12 MONTHS, YOU WORRIED THAT YOUR FOOD WOULD RUN OUT BEFORE YOU GOT MONEY TO BUY MORE.: NEVER TRUE

## 2023-07-06 SDOH — ECONOMIC STABILITY: INCOME INSECURITY: HOW HARD IS IT FOR YOU TO PAY FOR THE VERY BASICS LIKE FOOD, HOUSING, MEDICAL CARE, AND HEATING?: NOT HARD AT ALL

## 2023-07-06 ASSESSMENT — ENCOUNTER SYMPTOMS
ABDOMINAL PAIN: 0
DIARRHEA: 0
SHORTNESS OF BREATH: 1
VOMITING: 0
NAUSEA: 0
SORE THROAT: 0

## 2023-07-06 NOTE — PROGRESS NOTES
Jonathan Aguilar (:  1945) is a 66 y.o. female,New patient, here for evaluation of the following chief complaint(s):  New Patient (Dr. Livier Fletcher went to [facility]. )      ASSESSMENT/PLAN:  1. Encounter to establish care  2. Vitamin D deficiency  3. Peripheral edema  -     ECHO 2D WO Color Doppler Complete; Future  -     furosemide (LASIX) 20 MG tablet; Take 1 tablet by mouth daily as needed (edema) Take for only 3 days in a row for edema as needed, Disp-30 tablet, R-1Normal  4. Hyperlipidemia, unspecified hyperlipidemia type  -     CT CARDIAC CALCIUM SCORING; Future  5. Generalized edema  Assessment & Plan:   Repeat echo, Lasix as needed, might need cardiology referral in the near future  Orders:  -     ECHO 2D WO Color Doppler Complete; Future  -     furosemide (LASIX) 20 MG tablet; Take 1 tablet by mouth daily as needed (edema) Take for only 3 days in a row for edema as needed, Disp-30 tablet, R-1Normal  6. ASCVD (arteriosclerotic cardiovascular disease)  -     CT CARDIAC CALCIUM SCORING; Future  7. Vision changes  Comments:  Advised to discuss with ophthalmologist, might need an MRI of the brain for further evaluation  8. Varicose veins of left lower extremity with edema      Return in about 3 months (around 10/6/2023), or if symptoms worsen or fail to improve, for Medicare Wellness .     SUBJECTIVE/OBJECTIVE:  HPI    Pt here to establish care  - on supplements     Peripheral Edema  - was on lasix for sometime - was advised to take for few days then stop   - mostly on ankles and feet and legs  - ongoing for \"a number of years\"  - other tests like kidneys and varicose veins, ECHO in  was normal  - no history of heart disease  - history of palpitations - was seen by cardiology - did not have edema at that time  - some SOB edema gets worse   - uses compression stocking  - no history of high blood pressure     HLD  - not on statins      The 10-year ASCVD risk score (Kimberlyn MANDUJANO, et al., 2019) is: 23%    Values

## 2023-07-20 ENCOUNTER — TELEPHONE (OUTPATIENT)
Dept: PRIMARY CARE CLINIC | Age: 78
End: 2023-07-20

## 2023-07-20 NOTE — TELEPHONE ENCOUNTER
[phone] ([place])   Called patient and spoke with her. Per Dr. Jonathon Lu request, I notified Patient that her insurance typically does not cover the test that she's having done at [facility], and that she should not get the test done until the insurance states that the test is covered and approved. Patient understood.

## 2023-07-20 NOTE — TELEPHONE ENCOUNTER
Shoaib Franklin from [address] [employer] called stating pt has [address] [[id number] scheduled for 7/25 and a peer to peer is needed. She stated [employer] typically doesn't cover them.    Vera Barajas # [phone]   Tracking number # [id number]

## 2023-07-20 NOTE — TELEPHONE ENCOUNTER
Okay noted    I will call that number sometime today or tomorrow    Please call advise patient to not get this test done until it is clear if her insurance will cover or not.     Dr. Cresencio Salomon

## 2023-07-21 NOTE — TELEPHONE ENCOUNTER
Please call advise patient that after peer to peer done today it seems like CT calcium scoring is not covered by Medicare, despite her risk score being high and having high cholesterol. Another option is for patient to see how much it costs out-of-pocket, however if not comfortable with paying out-of-pocket, would recommend not getting this done.     We can discuss other options at next appointment    Please let me know if patient has any questions about this    Dr. Adriel Sarmiento

## 2023-07-27 NOTE — TELEPHONE ENCOUNTER
[phone] ([place])   Called patient and spoke with her. I gave her Dr. Margarita Boyd suggestion regarding the CT Calcium Scoring test, patient understood.

## 2023-08-15 ENCOUNTER — TELEPHONE (OUTPATIENT)
Dept: PRIMARY CARE CLINIC | Age: 78
End: 2023-08-15

## 2023-08-15 NOTE — TELEPHONE ENCOUNTER
----- Message from Becki Cuevas sent at 8/14/2023  8:59 AM EDT -----  Subject: Message to Provider    QUESTIONS  Information for Provider? pt is concerned as her appt is tomorrow and she   didn't see any notes that stated that pro [facility] had sent over her cardiac   scoring, please advise.   ---------------------------------------------------------------------------  --------------  [address]  [phone]; OK to leave message on voicemail  ---------------------------------------------------------------------------  --------------  SCRIPT ANSWERS  Relationship to Patient?  Self

## 2023-08-16 ENCOUNTER — TELEPHONE (OUTPATIENT)
Dept: PRIMARY CARE CLINIC | Age: 78
End: 2023-08-16

## 2023-08-17 ENCOUNTER — OFFICE VISIT (OUTPATIENT)
Dept: PRIMARY CARE CLINIC | Age: 78
End: 2023-08-17
Payer: MEDICARE

## 2023-08-17 VITALS
BODY MASS INDEX: 30.21 KG/M2 | HEART RATE: 84 BPM | DIASTOLIC BLOOD PRESSURE: 80 MMHG | OXYGEN SATURATION: 94 % | WEIGHT: 187.2 LBS | SYSTOLIC BLOOD PRESSURE: 126 MMHG | TEMPERATURE: 98.2 F

## 2023-08-17 DIAGNOSIS — I87.2 EDEMA OF BOTH LOWER EXTREMITIES DUE TO PERIPHERAL VENOUS INSUFFICIENCY: ICD-10-CM

## 2023-08-17 DIAGNOSIS — E78.5 HYPERLIPIDEMIA, UNSPECIFIED HYPERLIPIDEMIA TYPE: ICD-10-CM

## 2023-08-17 DIAGNOSIS — I25.10 ASCVD (ARTERIOSCLEROTIC CARDIOVASCULAR DISEASE): Primary | ICD-10-CM

## 2023-08-17 PROBLEM — R60.1 GENERALIZED EDEMA: Status: RESOLVED | Noted: 2023-07-06 | Resolved: 2023-08-17

## 2023-08-17 PROBLEM — R60.9 PERIPHERAL EDEMA: Status: RESOLVED | Noted: 2023-07-06 | Resolved: 2023-08-17

## 2023-08-17 PROBLEM — R60.0 PERIPHERAL EDEMA: Status: RESOLVED | Noted: 2023-07-06 | Resolved: 2023-08-17

## 2023-08-17 PROCEDURE — 99213 OFFICE O/P EST LOW 20 MIN: CPT | Performed by: FAMILY MEDICINE

## 2023-08-17 PROCEDURE — 1123F ACP DISCUSS/DSCN MKR DOCD: CPT | Performed by: FAMILY MEDICINE

## 2023-08-17 ASSESSMENT — ENCOUNTER SYMPTOMS
SHORTNESS OF BREATH: 0
SORE THROAT: 0
COUGH: 0

## 2023-08-17 NOTE — PROGRESS NOTES
Demetria Banegas (:  1945) is a 66 y.o. female,Established patient, here for evaluation of the following chief complaint(s):  Discuss Labs (Discuss [facility] )      ASSESSMENT/PLAN:  1. ASCVD (arteriosclerotic cardiovascular disease)  2. Hyperlipidemia, unspecified hyperlipidemia type  3. Edema of both lower extremities due to peripheral venous insufficiency      Overall Samantha Alston is doing well  Discussed ASCVD risk score in terms of calcium CT score  Although patient would need statins based on the ASCVD risk score however calcium score less than 75th percentile, as per up-to-date statins not recommended. Patient demonstrated understanding agreeable to plan      Return in about 3 months (around 2023), or if symptoms worsen or fail to improve, for Medicare Wellness . SUBJECTIVE/OBJECTIVE:  HPI    Patient here to discuss ASCVD  Hyperlipidemia  - has Ca Score CT done   -Also has high cholesterol, last lipid profile as per chart 2020  -Discussed the risks of her calcium score, although her percentile is 47th percentile      The 10-year ASCVD risk score (Kimberlyn MANDUJANO, et al., 2019) is: 21.8%    Values used to calculate the score:      Age: 66 years      Sex: Female      Is Non- : No      Diabetic: No      Tobacco smoker: No      Systolic Blood Pressure: 809 mmHg      Is BP treated: No      HDL Cholesterol: 70 mg/dL      Total Cholesterol: 244 mg/dL      Leg swelling  -This is a chronic issue  -Takes Lasix as needed which helps  -Reviewed venous ultrasound done on 2018 which showed bilateral venous insufficiency  -Echocardiogram done on 2023 also reviewed, normal ejection fraction    Review of Systems   Constitutional:  Negative for chills and fever. HENT:  Negative for congestion and sore throat. Respiratory:  Negative for cough and shortness of breath. Cardiovascular:  Positive for leg swelling. Genitourinary:  Negative for dysuria and urgency.    Neurological:

## 2023-08-17 NOTE — TELEPHONE ENCOUNTER
Called patient and left a message letting patient know that we received her [facility] fax for today's appointment.

## 2023-09-17 SDOH — HEALTH STABILITY: PHYSICAL HEALTH: ON AVERAGE, HOW MANY MINUTES DO YOU ENGAGE IN EXERCISE AT THIS LEVEL?: 40 MIN

## 2023-09-17 SDOH — HEALTH STABILITY: PHYSICAL HEALTH: ON AVERAGE, HOW MANY DAYS PER WEEK DO YOU ENGAGE IN MODERATE TO STRENUOUS EXERCISE (LIKE A BRISK WALK)?: 4 DAYS

## 2023-09-17 ASSESSMENT — PATIENT HEALTH QUESTIONNAIRE - PHQ9
2. FEELING DOWN, DEPRESSED OR HOPELESS: 0
SUM OF ALL RESPONSES TO PHQ9 QUESTIONS 1 & 2: 0
1. LITTLE INTEREST OR PLEASURE IN DOING THINGS: 0
SUM OF ALL RESPONSES TO PHQ QUESTIONS 1-9: 0

## 2023-09-17 ASSESSMENT — LIFESTYLE VARIABLES
HOW MANY STANDARD DRINKS CONTAINING ALCOHOL DO YOU HAVE ON A TYPICAL DAY: 1 OR 2
HOW OFTEN DO YOU HAVE A DRINK CONTAINING ALCOHOL: MONTHLY OR LESS
HOW OFTEN DO YOU HAVE SIX OR MORE DRINKS ON ONE OCCASION: 1
HOW OFTEN DO YOU HAVE A DRINK CONTAINING ALCOHOL: 2
HOW MANY STANDARD DRINKS CONTAINING ALCOHOL DO YOU HAVE ON A TYPICAL DAY: 1

## 2023-09-20 ENCOUNTER — OFFICE VISIT (OUTPATIENT)
Dept: PRIMARY CARE CLINIC | Age: 78
End: 2023-09-20
Payer: MEDICARE

## 2023-09-20 VITALS
RESPIRATION RATE: 16 BRPM | BODY MASS INDEX: 30.7 KG/M2 | HEIGHT: 66 IN | HEART RATE: 71 BPM | WEIGHT: 191 LBS | TEMPERATURE: 96.9 F | DIASTOLIC BLOOD PRESSURE: 80 MMHG | SYSTOLIC BLOOD PRESSURE: 124 MMHG | OXYGEN SATURATION: 98 %

## 2023-09-20 DIAGNOSIS — Z00.00 MEDICARE ANNUAL WELLNESS VISIT, SUBSEQUENT: Primary | ICD-10-CM

## 2023-09-20 DIAGNOSIS — I87.2 EDEMA OF BOTH LOWER EXTREMITIES DUE TO PERIPHERAL VENOUS INSUFFICIENCY: ICD-10-CM

## 2023-09-20 DIAGNOSIS — R73.09 OTHER ABNORMAL GLUCOSE: ICD-10-CM

## 2023-09-20 DIAGNOSIS — I25.10 ASCVD (ARTERIOSCLEROTIC CARDIOVASCULAR DISEASE): ICD-10-CM

## 2023-09-20 DIAGNOSIS — E78.5 HYPERLIPIDEMIA, UNSPECIFIED HYPERLIPIDEMIA TYPE: ICD-10-CM

## 2023-09-20 PROCEDURE — 1123F ACP DISCUSS/DSCN MKR DOCD: CPT | Performed by: FAMILY MEDICINE

## 2023-09-20 PROCEDURE — G0439 PPPS, SUBSEQ VISIT: HCPCS | Performed by: FAMILY MEDICINE

## 2023-09-20 RX ORDER — BUMETANIDE 2 MG/1
2 TABLET ORAL DAILY PRN
Qty: 10 TABLET | Refills: 0 | Status: SHIPPED | OUTPATIENT
Start: 2023-09-20

## 2023-09-26 LAB
ALBUMIN SERPL-MCNC: 4.3 G/DL (ref 3.5–5.7)
ALP BLD-CCNC: 75 U/L (ref 36–125)
ALT SERPL-CCNC: 13 U/L (ref 7–52)
ANION GAP SERPL CALCULATED.3IONS-SCNC: 8 MMOL/L (ref 3–16)
AST SERPL-CCNC: 15 U/L (ref 13–39)
BASOPHILS ABSOLUTE: 32 /UL (ref 0–200)
BASOPHILS RELATIVE PERCENT: 0.6 % (ref 0–1)
BILIRUB SERPL-MCNC: 0.7 MG/DL (ref 0–1.5)
BUN BLDV-MCNC: 24 MG/DL (ref 7–25)
CALCIUM SERPL-MCNC: 9.6 MG/DL (ref 8.6–10.3)
CHLORIDE BLD-SCNC: 103 MMOL/L (ref 98–110)
CHOLESTEROL, TOTAL: 269 MG/DL (ref 0–200)
CO2: 33 MMOL/L (ref 21–33)
CREAT SERPL-MCNC: 1.15 MG/DL (ref 0.6–1.3)
EOSINOPHILS ABSOLUTE: 122 /UL (ref 15–500)
EOSINOPHILS RELATIVE PERCENT: 2.3 % (ref 0–8)
ESTIMATED GLOMERULAR FILTRATION RATE CREATININE EQUATION: 49
GLUCOSE BLD-MCNC: 109 MG/DL (ref 70–100)
HBA1C MFR BLD: 5.5 % (ref 4–5.6)
HCT VFR BLD CALC: 39.9 % (ref 35–45)
HDLC SERPL-MCNC: 65 MG/DL (ref 60–92)
HEMOGLOBIN: 13.3 G/DL (ref 11.7–15.5)
LDL CHOLESTEROL CALCULATED: 155 MG/DL
LYMPHOCYTES ABSOLUTE: 1950 /UL (ref 850–3900)
LYMPHOCYTES RELATIVE PERCENT: 36.8 % (ref 15–45)
MCH RBC QN AUTO: 30.4 PG (ref 27–33)
MCHC RBC AUTO-ENTMCNC: 33.3 G/DL (ref 32–36)
MCV RBC AUTO: 91.3 FL (ref 80–100)
MONOCYTES ABSOLUTE: 583 /UL (ref 200–950)
MONOCYTES RELATIVE PERCENT: 11 % (ref 0–12)
NEUTROPHILS ABSOLUTE: 2613 /UL (ref 1500–7800)
NON-HDL CHOLESTEROL: 204 MG/DL (ref 0–129)
NUCLEATED RED BLOOD CELLS: 0 /100 WBC (ref 0–0)
OSMOLALITY CALCULATION: 303 MOSM/KG (ref 278–305)
PDW BLD-RTO: 14.8 % (ref 11–15)
PLATELET # BLD: 228 10E3/UL (ref 140–400)
PMV BLD AUTO: 7.7 FL (ref 7.5–11.5)
POTASSIUM SERPL-SCNC: 4.1 MMOL/L (ref 3.5–5.3)
RBC # BLD: 4.37 10E6/UL (ref 3.8–5.1)
SEGMENTED NEUTROPHILS RELATIVE PERCENT: 49.3 % (ref 40–80)
SODIUM BLD-SCNC: 144 MMOL/L (ref 133–146)
TOTAL PROTEIN: 7.9 G/DL (ref 6.4–8.9)
TRIGL SERPL-MCNC: 247 MG/DL (ref 10–149)
WBC # BLD: 5.3 10E3/UL (ref 3.8–10.8)

## 2023-11-28 ENCOUNTER — OFFICE VISIT (OUTPATIENT)
Dept: PRIMARY CARE CLINIC | Age: 78
End: 2023-11-28
Payer: MEDICARE

## 2023-11-28 VITALS
BODY MASS INDEX: 30.83 KG/M2 | DIASTOLIC BLOOD PRESSURE: 88 MMHG | HEART RATE: 70 BPM | OXYGEN SATURATION: 97 % | WEIGHT: 191.01 LBS | TEMPERATURE: 97.1 F | SYSTOLIC BLOOD PRESSURE: 126 MMHG

## 2023-11-28 DIAGNOSIS — Z82.3 FAMILY HISTORY OF CEREBROVASCULAR ACCIDENT (CVA) IN MOTHER: ICD-10-CM

## 2023-11-28 DIAGNOSIS — H53.9 VISION ABNORMALITIES: ICD-10-CM

## 2023-11-28 DIAGNOSIS — R20.0 NUMBNESS OF LEFT HAND: Primary | ICD-10-CM

## 2023-11-28 PROCEDURE — 1123F ACP DISCUSS/DSCN MKR DOCD: CPT | Performed by: FAMILY MEDICINE

## 2023-11-28 PROCEDURE — 99214 OFFICE O/P EST MOD 30 MIN: CPT | Performed by: FAMILY MEDICINE

## 2023-11-28 ASSESSMENT — ENCOUNTER SYMPTOMS
COUGH: 0
VOMITING: 0
SHORTNESS OF BREATH: 0
SORE THROAT: 0
EYE ITCHING: 0
NAUSEA: 0
EYE DISCHARGE: 0
DIARRHEA: 0
ABDOMINAL PAIN: 0
TROUBLE SWALLOWING: 0

## 2023-11-28 NOTE — PROGRESS NOTES
Nicole Rodrigues (:  1945) is a 66 y.o. female,Established patient, here for evaluation of the following chief complaint(s):  Eye Problem (Started 6 months ago, has had 3 episodes, two in left eye and one in right. States she gets a black spot that looks as big around as a dime. ) and Numbness (Left hand numbness)      ASSESSMENT/PLAN:  1. Numbness of left hand  -     DME Order for (Specify) as OP  -     MRI BRAIN WO CONTRAST; Future  2. Vision abnormalities  -     MRI BRAIN WO CONTRAST; Future  3. Family history of cerebrovascular accident (CVA) in mother  -     [address] [place] [address]; Future    In terms of left hand numbness, most likely carpal tunnel versus some type of neuropathy. EMG versus splint discussed, patient agreeable to left hand splints nightly or in the day as needed  If does not help might need to see a hand surgeon versus EMG  In terms of her visual symptoms, was recently evaluated by ophthalmology and was told that she might have had \"mini strokes\", her risk factors include family history of stroke in mother x 3 and also elevated cholesterol  Discussed various options, we agreed on MRI of the brain without contrast.  If there are any abnormalities or old strokes she might need to be started on aspirin along with a cholesterol medication or referral to neurology  Patient agreeable to plan and demonstrates    No follow-ups on file.     SUBJECTIVE/OBJECTIVE:  HPI    Vision - Black spots  - 2x in the left and 1x in the right eye - SPOT - circling, \"gets bigger and biggeR\"  - [place] with squares inside of it  - might reach the size of a dime, reach 3-10 minutes  - denies any other symptoms such as headache numbness or tinging or weakness  - last time she was sitting at the backyard     Numbness of Left Hand   - can just be sitting on the couch, it gets so numb, \"shakes\" her hand to wake it up  - happened all the time  - not at night   -Does not work on the computer, or any extensive movements of

## 2023-12-15 ENCOUNTER — HOSPITAL ENCOUNTER (OUTPATIENT)
Dept: MRI IMAGING | Age: 78
Discharge: HOME | End: 2023-12-15
Payer: MEDICARE

## 2023-12-15 DIAGNOSIS — R20.0 NUMBNESS OF LEFT HAND: ICD-10-CM

## 2023-12-15 DIAGNOSIS — H53.9 VISION ABNORMALITIES: ICD-10-CM

## 2023-12-15 DIAGNOSIS — Z82.3 FAMILY HISTORY OF CEREBROVASCULAR ACCIDENT (CVA) IN MOTHER: ICD-10-CM

## 2023-12-15 PROCEDURE — 70551 MRI BRAIN STEM W/O DYE: CPT

## 2024-09-07 SDOH — ECONOMIC STABILITY: FOOD INSECURITY: WITHIN THE PAST 12 MONTHS, THE FOOD YOU BOUGHT JUST DIDN'T LAST AND YOU DIDN'T HAVE MONEY TO GET MORE.: NEVER TRUE

## 2024-09-07 SDOH — ECONOMIC STABILITY: TRANSPORTATION INSECURITY
IN THE PAST 12 MONTHS, HAS LACK OF TRANSPORTATION KEPT YOU FROM MEETINGS, WORK, OR FROM GETTING THINGS NEEDED FOR DAILY LIVING?: NO

## 2024-09-07 SDOH — ECONOMIC STABILITY: FOOD INSECURITY: WITHIN THE PAST 12 MONTHS, YOU WORRIED THAT YOUR FOOD WOULD RUN OUT BEFORE YOU GOT MONEY TO BUY MORE.: NEVER TRUE

## 2024-09-07 SDOH — HEALTH STABILITY: PHYSICAL HEALTH: ON AVERAGE, HOW MANY MINUTES DO YOU ENGAGE IN EXERCISE AT THIS LEVEL?: 60 MIN

## 2024-09-07 SDOH — HEALTH STABILITY: PHYSICAL HEALTH: ON AVERAGE, HOW MANY DAYS PER WEEK DO YOU ENGAGE IN MODERATE TO STRENUOUS EXERCISE (LIKE A BRISK WALK)?: 4 DAYS

## 2024-09-07 SDOH — ECONOMIC STABILITY: INCOME INSECURITY: HOW HARD IS IT FOR YOU TO PAY FOR THE VERY BASICS LIKE FOOD, HOUSING, MEDICAL CARE, AND HEATING?: NOT HARD AT ALL

## 2024-09-07 ASSESSMENT — LIFESTYLE VARIABLES
HOW MANY STANDARD DRINKS CONTAINING ALCOHOL DO YOU HAVE ON A TYPICAL DAY: 0
HOW OFTEN DO YOU HAVE A DRINK CONTAINING ALCOHOL: NEVER
HOW MANY STANDARD DRINKS CONTAINING ALCOHOL DO YOU HAVE ON A TYPICAL DAY: PATIENT DOES NOT DRINK
HOW OFTEN DO YOU HAVE SIX OR MORE DRINKS ON ONE OCCASION: 1
HOW OFTEN DO YOU HAVE A DRINK CONTAINING ALCOHOL: 1

## 2024-09-07 ASSESSMENT — PATIENT HEALTH QUESTIONNAIRE - PHQ9
SUM OF ALL RESPONSES TO PHQ QUESTIONS 1-9: 0
2. FEELING DOWN, DEPRESSED OR HOPELESS: NOT AT ALL
SUM OF ALL RESPONSES TO PHQ9 QUESTIONS 1 & 2: 0
SUM OF ALL RESPONSES TO PHQ QUESTIONS 1-9: 0
1. LITTLE INTEREST OR PLEASURE IN DOING THINGS: NOT AT ALL

## 2024-09-10 ENCOUNTER — OFFICE VISIT (OUTPATIENT)
Dept: PRIMARY CARE CLINIC | Age: 79
End: 2024-09-10
Payer: MEDICARE

## 2024-09-10 VITALS
BODY MASS INDEX: 30.44 KG/M2 | TEMPERATURE: 97.3 F | SYSTOLIC BLOOD PRESSURE: 118 MMHG | DIASTOLIC BLOOD PRESSURE: 70 MMHG | WEIGHT: 189.4 LBS | OXYGEN SATURATION: 99 % | HEART RATE: 87 BPM | HEIGHT: 66 IN

## 2024-09-10 DIAGNOSIS — I25.10 ASCVD (ARTERIOSCLEROTIC CARDIOVASCULAR DISEASE): ICD-10-CM

## 2024-09-10 DIAGNOSIS — I83.893 VARICOSE VEINS OF BOTH LEGS WITH EDEMA: ICD-10-CM

## 2024-09-10 DIAGNOSIS — Z00.00 MEDICARE ANNUAL WELLNESS VISIT, SUBSEQUENT: Primary | ICD-10-CM

## 2024-09-10 DIAGNOSIS — R91.8 PULMONARY NODULES: ICD-10-CM

## 2024-09-10 DIAGNOSIS — E55.9 VITAMIN D DEFICIENCY: ICD-10-CM

## 2024-09-10 DIAGNOSIS — E78.5 HYPERLIPIDEMIA, UNSPECIFIED HYPERLIPIDEMIA TYPE: ICD-10-CM

## 2024-09-10 DIAGNOSIS — R11.0 NAUSEA: ICD-10-CM

## 2024-09-10 PROCEDURE — 1123F ACP DISCUSS/DSCN MKR DOCD: CPT | Performed by: FAMILY MEDICINE

## 2024-09-10 PROCEDURE — G0439 PPPS, SUBSEQ VISIT: HCPCS | Performed by: FAMILY MEDICINE

## 2024-09-12 LAB
ALBUMIN: 4.2 G/DL (ref 3.5–5.7)
ALP BLD-CCNC: 65 U/L (ref 36–125)
ALT SERPL-CCNC: 14 U/L (ref 7–52)
ANION GAP SERPL CALCULATED.3IONS-SCNC: 9 MMOL/L (ref 3–16)
AST SERPL-CCNC: 16 U/L (ref 13–39)
BASOPHILS ABSOLUTE: 42 /UL (ref 0–200)
BASOPHILS RELATIVE PERCENT: 0.9 % (ref 0–1)
BILIRUB SERPL-MCNC: 0.5 MG/DL (ref 0–1.5)
BUN BLDV-MCNC: 19 MG/DL (ref 7–25)
CALCIUM SERPL-MCNC: 9.5 MG/DL (ref 8.6–10.3)
CHLORIDE BLD-SCNC: 106 MMOL/L (ref 98–110)
CHOLESTEROL, TOTAL: 226 MG/DL (ref 0–200)
CO2: 28 MMOL/L (ref 21–33)
CREAT SERPL-MCNC: 1.01 MG/DL (ref 0.6–1.3)
EOSINOPHILS ABSOLUTE: 244 /UL (ref 15–500)
EOSINOPHILS RELATIVE PERCENT: 5.2 % (ref 0–8)
ESTIMATED GLOMERULAR FILTRATION RATE CREATININE EQUATION: 57
GLUCOSE BLD-MCNC: 92 MG/DL (ref 70–100)
HCT VFR BLD CALC: 38.3 % (ref 35–45)
HDLC SERPL-MCNC: 57 MG/DL (ref 60–92)
HEMOGLOBIN: 13 G/DL (ref 11.7–15.5)
LDL CHOLESTEROL: 122 MG/DL
LYMPHOCYTES ABSOLUTE: 1490 /UL (ref 850–3900)
LYMPHOCYTES RELATIVE PERCENT: 31.7 % (ref 15–45)
MCH RBC QN AUTO: 31.5 PG (ref 27–33)
MCHC RBC AUTO-ENTMCNC: 34.1 G/DL (ref 32–36)
MCV RBC AUTO: 92.4 FL (ref 80–100)
MONOCYTES ABSOLUTE: 447 /UL (ref 200–950)
MONOCYTES RELATIVE PERCENT: 9.5 % (ref 0–12)
NEUTROPHILS ABSOLUTE: 2477 /UL (ref 1500–7800)
NEUTROPHILS RELATIVE PERCENT: 52.7 % (ref 40–80)
NON-HDL CHOLESTEROL: 169 MG/DL (ref 0–129)
NUCLEATED RED BLOOD CELLS: 0 /100 WBC (ref 0–0)
OSMOLALITY CALCULATION: 298 MOSM/KG (ref 278–305)
PDW BLD-RTO: 14.5 % (ref 11–15)
PLATELET # BLD: 215 10E3/UL (ref 140–400)
PMV BLD AUTO: 7.7 FL (ref 7.5–11.5)
POTASSIUM SERPL-SCNC: 4.7 MMOL/L (ref 3.5–5.3)
RBC # BLD: 4.15 10E6/UL (ref 3.8–5.1)
SODIUM BLD-SCNC: 143 MMOL/L (ref 133–146)
TOTAL PROTEIN: 7.1 G/DL (ref 6.4–8.9)
TRIGL SERPL-MCNC: 237 MG/DL (ref 10–149)
TSH ULTRASENSITIVE: 1.98 UIU/ML (ref 0.45–4.12)
VITAMIN D 25-HYDROXY: 23.5 NG/ML (ref 30–100)
WBC # BLD: 4.7 10E3/UL (ref 3.8–10.8)

## 2024-10-08 ENCOUNTER — OFFICE VISIT (OUTPATIENT)
Dept: PRIMARY CARE CLINIC | Age: 79
End: 2024-10-08
Payer: MEDICARE

## 2024-10-08 VITALS
HEART RATE: 78 BPM | TEMPERATURE: 97.3 F | SYSTOLIC BLOOD PRESSURE: 118 MMHG | DIASTOLIC BLOOD PRESSURE: 80 MMHG | OXYGEN SATURATION: 99 % | BODY MASS INDEX: 30.37 KG/M2 | WEIGHT: 189 LBS | HEIGHT: 66 IN

## 2024-10-08 DIAGNOSIS — R19.7 DIARRHEA, UNSPECIFIED TYPE: ICD-10-CM

## 2024-10-08 DIAGNOSIS — R22.31 MASS OF RIGHT WRIST: Primary | ICD-10-CM

## 2024-10-08 PROCEDURE — 1123F ACP DISCUSS/DSCN MKR DOCD: CPT | Performed by: FAMILY MEDICINE

## 2024-10-08 PROCEDURE — 99213 OFFICE O/P EST LOW 20 MIN: CPT | Performed by: FAMILY MEDICINE

## 2024-10-08 RX ORDER — LIFITEGRAST 50 MG/ML
SOLUTION/ DROPS OPHTHALMIC
COMMUNITY

## 2024-10-08 ASSESSMENT — ENCOUNTER SYMPTOMS
COUGH: 0
BLOOD IN STOOL: 0
EYE ITCHING: 0
SHORTNESS OF BREATH: 0
TROUBLE SWALLOWING: 0
DIARRHEA: 1
SORE THROAT: 0
VOMITING: 0
EYE DISCHARGE: 0
NAUSEA: 0
ABDOMINAL PAIN: 0

## 2024-10-08 NOTE — PROGRESS NOTES
Aubree Olson (:  1945) is a 79 y.o. female,Established patient, here for evaluation of the following chief complaint(s):  Mass (On R wrist- medial ) and Other (Having diarrhea after every time she eats )      ASSESSMENT/PLAN:  1. Mass of right wrist  -     US SOFT TISSUE LIMITED AREA; Future  2. Diarrhea, unspecified type  -     GI Bacterial Pathogens By PCR; Future  -     GIARDIA ANTIGEN; Future  -     Fecal Leukocytes; Future  -     EIA ANTIGEN TEST STOOL #1; Future    Ganglion cyst versus lipoma versus bony abnormality, ultrasound for further evaluation  Depending on the results patient might need to just monitor this versus seeing orthopedic surgeon for possible removal  Until then warm compresses, topical medications discussed with patient  In terms of diarrhea unclear if this is infectious versus functional versus medication side effect  Advised to hold off on the magnesium to see if that helps with the diarrhea, if no improvement go ahead and get the stool studies which were ordered today  If still no improvement and stool studies negative consider ultrasound versus CT versus GI referral  Will hold off on medications until above is completed  Patient is agreeable to plan and demonstrates understanding    No follow-ups on file.    SUBJECTIVE/OBJECTIVE:  HPI    Right Wrist Mass  - for about 3-4 weeks  - same size  -painful  - some days not painful  - other days hurts with holding a brush  - natural oils - deep blue - helps    Diarrhea  - since last visit  - with every meal - loose stools  - no blood  - no abdominal pain   - no new medications  - no travel or camping  - not drinking from a stream  - no fevers or chills   - no new foods tried       Review of Systems   Constitutional:  Negative for appetite change, chills, fatigue and fever.   HENT:  Negative for congestion, ear pain, sneezing, sore throat and trouble swallowing.    Eyes:  Negative for discharge and itching.   Respiratory:  Negative for

## 2024-10-09 ENCOUNTER — HOSPITAL ENCOUNTER (OUTPATIENT)
Dept: ULTRASOUND IMAGING | Age: 79
Discharge: HOME | End: 2024-10-09
Payer: MEDICARE

## 2024-10-09 DIAGNOSIS — R22.31 MASS OF RIGHT WRIST: ICD-10-CM

## 2024-10-09 PROCEDURE — 76999 ECHO EXAMINATION PROCEDURE: CPT

## 2024-10-10 DIAGNOSIS — R19.7 DIARRHEA, UNSPECIFIED TYPE: ICD-10-CM

## 2024-10-10 LAB
GI PATHOGENS PNL STL NAA+PROBE: NORMAL
LACTOFERRIN STL QL IA: NORMAL

## 2024-10-11 LAB
CRYPTOSP AG STL QL IA: NORMAL
E HISTOLYT AG STL QL IA: NORMAL
G LAMBLIA AG STL QL IA: NORMAL

## 2025-07-07 SDOH — ECONOMIC STABILITY: FOOD INSECURITY: WITHIN THE PAST 12 MONTHS, THE FOOD YOU BOUGHT JUST DIDN'T LAST AND YOU DIDN'T HAVE MONEY TO GET MORE.: NEVER TRUE

## 2025-07-07 SDOH — ECONOMIC STABILITY: INCOME INSECURITY: IN THE LAST 12 MONTHS, WAS THERE A TIME WHEN YOU WERE NOT ABLE TO PAY THE MORTGAGE OR RENT ON TIME?: NO

## 2025-07-07 SDOH — ECONOMIC STABILITY: FOOD INSECURITY: WITHIN THE PAST 12 MONTHS, YOU WORRIED THAT YOUR FOOD WOULD RUN OUT BEFORE YOU GOT MONEY TO BUY MORE.: NEVER TRUE

## 2025-07-07 SDOH — ECONOMIC STABILITY: TRANSPORTATION INSECURITY
IN THE PAST 12 MONTHS, HAS THE LACK OF TRANSPORTATION KEPT YOU FROM MEDICAL APPOINTMENTS OR FROM GETTING MEDICATIONS?: NO

## 2025-07-07 ASSESSMENT — PATIENT HEALTH QUESTIONNAIRE - PHQ9
SUM OF ALL RESPONSES TO PHQ QUESTIONS 1-9: 0
1. LITTLE INTEREST OR PLEASURE IN DOING THINGS: NOT AT ALL
SUM OF ALL RESPONSES TO PHQ QUESTIONS 1-9: 0
2. FEELING DOWN, DEPRESSED OR HOPELESS: NOT AT ALL

## 2025-07-08 ASSESSMENT — PATIENT HEALTH QUESTIONNAIRE - PHQ9
SUM OF ALL RESPONSES TO PHQ9 QUESTIONS 1 & 2: 0
1. LITTLE INTEREST OR PLEASURE IN DOING THINGS: NOT AT ALL
2. FEELING DOWN, DEPRESSED OR HOPELESS: NOT AT ALL

## 2025-07-16 ENCOUNTER — OFFICE VISIT (OUTPATIENT)
Dept: PRIMARY CARE CLINIC | Age: 80
End: 2025-07-16
Payer: MEDICARE

## 2025-07-16 VITALS
DIASTOLIC BLOOD PRESSURE: 80 MMHG | WEIGHT: 188.6 LBS | HEART RATE: 76 BPM | BODY MASS INDEX: 30.31 KG/M2 | HEIGHT: 66 IN | SYSTOLIC BLOOD PRESSURE: 138 MMHG | OXYGEN SATURATION: 100 %

## 2025-07-16 DIAGNOSIS — K57.90 DIVERTICULOSIS: ICD-10-CM

## 2025-07-16 DIAGNOSIS — K52.9 CHRONIC DIARRHEA: Primary | ICD-10-CM

## 2025-07-16 DIAGNOSIS — Z80.0 FAMILY HISTORY OF COLON CANCER: ICD-10-CM

## 2025-07-16 PROCEDURE — 99213 OFFICE O/P EST LOW 20 MIN: CPT | Performed by: FAMILY MEDICINE

## 2025-07-16 PROCEDURE — 1159F MED LIST DOCD IN RCRD: CPT | Performed by: FAMILY MEDICINE

## 2025-07-16 PROCEDURE — 1160F RVW MEDS BY RX/DR IN RCRD: CPT | Performed by: FAMILY MEDICINE

## 2025-07-16 PROCEDURE — 1123F ACP DISCUSS/DSCN MKR DOCD: CPT | Performed by: FAMILY MEDICINE

## 2025-07-16 ASSESSMENT — ENCOUNTER SYMPTOMS
EYE DISCHARGE: 0
DIARRHEA: 1
COUGH: 0
ABDOMINAL PAIN: 0
TROUBLE SWALLOWING: 0
EYE ITCHING: 0
NAUSEA: 0
SORE THROAT: 0
VOMITING: 0
SHORTNESS OF BREATH: 0

## 2025-07-16 NOTE — PROGRESS NOTES
Aubree Olson (:  1945) is a 80 y.o. female,Established patient, here for evaluation of the following chief complaint(s):  GI Problem      ASSESSMENT/PLAN:  1. Chronic diarrhea  -     Pancrelipase, Lip-Prot-Amyl, 5875-2009 units CPEP; Take 1 tablet by mouth 3 times daily (with meals), Disp-30 capsule, R-0Normal  -     AFL - DryIgor collins MD, Gastroenterology, [facility]  2. Diverticulosis  Assessment & Plan:     Orders:  -     Pancrelipase, Lip-Prot-Amyl, 5386-2174 units CPEP; Take 1 tablet by mouth 3 times daily (with meals), Disp-30 capsule, R-0Normal  -     AFL - DryIgor collins MD, Gastroenterology, [facility]  3. Family history of colon cancer  -     Pancrelipase, Lip-Prot-Amyl, 3995-6486 units CPEP; Take 1 tablet by mouth 3 times daily (with meals), Disp-30 capsule, R-0Normal  -     AFL - DryIgor collins MD, Gastroenterology, [facility]    Trial of creon, if not helping try bentyl  Ref to GI  Pt agreeable to plan and demonstrated understanding     No follow-ups on file.    SUBJECTIVE/OBJECTIVE:  HPI    Patient presents with chronic diarrhea  - This has been an ongoing concern  - Was seen for this on 10/8, was advised to stop magnesium otherwise get stool studies done, all negative including fecal leukocytes  - stayed the same  - gets up in the morning and drinks coffee - go to the bathroom, \"loose\" stool  - eats bkfst - sometimes has to leave the table  Regular colonoscopies - all normal    - due next year   - no blood  - during the day 4-5 times a day  - never tried any medications    Review of Systems   Constitutional:  Negative for appetite change, chills, fatigue and fever.   HENT:  Negative for congestion, ear pain, sneezing, sore throat and trouble swallowing.    Eyes:  Negative for discharge and itching.   Respiratory:  Negative for cough and shortness of breath.    Cardiovascular:  Negative for chest pain and leg swelling.   Gastrointestinal:  Positive for